# Patient Record
Sex: FEMALE | Race: BLACK OR AFRICAN AMERICAN | Employment: UNEMPLOYED | URBAN - METROPOLITAN AREA
[De-identification: names, ages, dates, MRNs, and addresses within clinical notes are randomized per-mention and may not be internally consistent; named-entity substitution may affect disease eponyms.]

---

## 2017-07-17 ENCOUNTER — OFFICE VISIT (OUTPATIENT)
Dept: FAMILY MEDICINE CLINIC | Facility: CLINIC | Age: 42
End: 2017-07-17

## 2017-07-17 VITALS
BODY MASS INDEX: 29.14 KG/M2 | SYSTOLIC BLOOD PRESSURE: 118 MMHG | DIASTOLIC BLOOD PRESSURE: 76 MMHG | HEIGHT: 65.5 IN | HEART RATE: 96 BPM | WEIGHT: 177 LBS

## 2017-07-17 DIAGNOSIS — Z00.00 LABORATORY EXAMINATION ORDERED AS PART OF A ROUTINE GENERAL MEDICAL EXAMINATION: ICD-10-CM

## 2017-07-17 DIAGNOSIS — Z86.39 HISTORY OF IRON DEFICIENCY: ICD-10-CM

## 2017-07-17 DIAGNOSIS — Z12.39 BREAST CANCER SCREENING: ICD-10-CM

## 2017-07-17 DIAGNOSIS — Z86.39 HISTORY OF VITAMIN D DEFICIENCY: ICD-10-CM

## 2017-07-17 DIAGNOSIS — Z01.419 WELL FEMALE EXAM WITH ROUTINE GYNECOLOGICAL EXAM: Primary | ICD-10-CM

## 2017-07-17 DIAGNOSIS — Z12.4 SCREENING FOR MALIGNANT NEOPLASM OF CERVIX: ICD-10-CM

## 2017-07-17 DIAGNOSIS — N92.0 MENORRHAGIA WITH REGULAR CYCLE: ICD-10-CM

## 2017-07-17 DIAGNOSIS — Z23 NEED FOR VACCINATION: ICD-10-CM

## 2017-07-17 DIAGNOSIS — D25.1 INTRAMURAL LEIOMYOMA OF UTERUS: ICD-10-CM

## 2017-07-17 PROCEDURE — 88175 CYTOPATH C/V AUTO FLUID REDO: CPT | Performed by: FAMILY MEDICINE

## 2017-07-17 PROCEDURE — 87624 HPV HI-RISK TYP POOLED RSLT: CPT | Performed by: FAMILY MEDICINE

## 2017-07-17 PROCEDURE — 99213 OFFICE O/P EST LOW 20 MIN: CPT | Performed by: FAMILY MEDICINE

## 2017-07-17 PROCEDURE — 99396 PREV VISIT EST AGE 40-64: CPT | Performed by: FAMILY MEDICINE

## 2017-07-17 PROCEDURE — 90715 TDAP VACCINE 7 YRS/> IM: CPT | Performed by: FAMILY MEDICINE

## 2017-07-17 PROCEDURE — 90471 IMMUNIZATION ADMIN: CPT | Performed by: FAMILY MEDICINE

## 2017-07-17 RX ORDER — CLINDAMYCIN HYDROCHLORIDE 300 MG/1
CAPSULE ORAL
Refills: 0 | COMMUNITY
Start: 2017-04-20 | End: 2017-07-17 | Stop reason: ALTCHOICE

## 2017-07-17 NOTE — PROGRESS NOTES
HPI:   Breanna Carroll is a 38year old female who presents for a complete physical exam. Symptoms: denies discharge, itching, burning or dysuria, periods are regular.    Heavy periods for 2 days of the menstrual cycle for years has history of large fibro Father      DM   • liver failure[other] [OTHER] Father    • liver transplant[other] [OTHER] Father    • hepatitis C[other] [OTHER] Father    • Diabetes Paternal Grandmother       Social History:   Smoking status: Never Smoker symmetrical, no suspicious mass, no nipple dimpling or discharge.   LUNGS: clear to auscultation bilateral, no rales, rhonchi or wheezing  CARDIO: RRR without murmur normal S1S2  ABD:  normal bowel sounds,soft, non tender, no masses, HSM or tenderness  :e guidance provided recommended low fat diet and aerobic exercise 30 minutes 3-4 times weekly. 2. Intramural leiomyoma of uterus   Menorrhagia with regular cycle    - CBC WITH DIFFERENTIAL WITH PLATELET; Future  - FERRITIN; Future  - FE+TIBC+KENIA;  Future

## 2017-07-18 LAB — HPV I/H RISK 1 DNA SPEC QL NAA+PROBE: NEGATIVE

## 2017-07-19 ENCOUNTER — LAB ENCOUNTER (OUTPATIENT)
Dept: LAB | Age: 42
End: 2017-07-19
Attending: FAMILY MEDICINE
Payer: COMMERCIAL

## 2017-07-19 ENCOUNTER — TELEPHONE (OUTPATIENT)
Dept: FAMILY MEDICINE CLINIC | Facility: CLINIC | Age: 42
End: 2017-07-19

## 2017-07-19 DIAGNOSIS — N92.0 MENORRHAGIA WITH REGULAR CYCLE: ICD-10-CM

## 2017-07-19 DIAGNOSIS — D25.1 INTRAMURAL LEIOMYOMA OF UTERUS: ICD-10-CM

## 2017-07-19 DIAGNOSIS — Z86.39 HISTORY OF IRON DEFICIENCY: ICD-10-CM

## 2017-07-19 DIAGNOSIS — Z86.39 HISTORY OF VITAMIN D DEFICIENCY: ICD-10-CM

## 2017-07-19 DIAGNOSIS — Z00.00 LABORATORY EXAMINATION ORDERED AS PART OF A ROUTINE GENERAL MEDICAL EXAMINATION: ICD-10-CM

## 2017-07-19 DIAGNOSIS — Z86.39 HISTORY OF IRON DEFICIENCY: Primary | ICD-10-CM

## 2017-07-19 LAB
25-HYDROXYVITAMIN D (TOTAL): 20.2 NG/ML (ref 30–100)
ALBUMIN SERPL-MCNC: 3.7 G/DL (ref 3.5–4.8)
ALP LIVER SERPL-CCNC: 61 U/L (ref 37–98)
ALT SERPL-CCNC: 21 U/L (ref 14–54)
AST SERPL-CCNC: 20 U/L (ref 15–41)
BASOPHILS # BLD AUTO: 0.07 X10(3) UL (ref 0–0.1)
BASOPHILS NFR BLD AUTO: 1 %
BILIRUB SERPL-MCNC: 0.3 MG/DL (ref 0.1–2)
BUN BLD-MCNC: 10 MG/DL (ref 8–20)
CALCIUM BLD-MCNC: 8.7 MG/DL (ref 8.3–10.3)
CHLORIDE: 109 MMOL/L (ref 101–111)
CHOLEST SMN-MCNC: 179 MG/DL (ref ?–200)
CO2: 24 MMOL/L (ref 22–32)
CREAT BLD-MCNC: 0.83 MG/DL (ref 0.55–1.02)
DEPRECATED HBV CORE AB SER IA-ACNC: 8.4 NG/ML (ref 10–291)
EOSINOPHIL # BLD AUTO: 0.1 X10(3) UL (ref 0–0.3)
EOSINOPHIL NFR BLD AUTO: 1.4 %
ERYTHROCYTE [DISTWIDTH] IN BLOOD BY AUTOMATED COUNT: 18.4 % (ref 11.5–16)
FREE T4: 1 NG/DL (ref 0.9–1.8)
GLUCOSE BLD-MCNC: 93 MG/DL (ref 70–99)
HCT VFR BLD AUTO: 35.4 % (ref 34–50)
HDLC SERPL-MCNC: 44 MG/DL (ref 45–?)
HDLC SERPL: 4.07 {RATIO} (ref ?–4.44)
HGB BLD-MCNC: 10.8 G/DL (ref 12–16)
IMMATURE GRANULOCYTE COUNT: 0.02 X10(3) UL (ref 0–1)
IMMATURE GRANULOCYTE RATIO %: 0.3 %
IRON SATURATION: 6 % (ref 13–45)
IRON: 30 UG/DL (ref 28–170)
LAST PAP RESULT: NORMAL
LDLC SERPL CALC-MCNC: 114 MG/DL (ref ?–130)
LDLC SERPL-MCNC: 21 MG/DL (ref 5–40)
LYMPHOCYTES # BLD AUTO: 1.6 X10(3) UL (ref 0.9–4)
LYMPHOCYTES NFR BLD AUTO: 22.9 %
M PROTEIN MFR SERPL ELPH: 7.8 G/DL (ref 6.1–8.3)
MCH RBC QN AUTO: 22.8 PG (ref 27–33.2)
MCHC RBC AUTO-ENTMCNC: 30.5 G/DL (ref 31–37)
MCV RBC AUTO: 74.7 FL (ref 81–100)
MONOCYTES # BLD AUTO: 0.63 X10(3) UL (ref 0.1–0.6)
MONOCYTES NFR BLD AUTO: 9 %
NEUTROPHIL ABS PRELIM: 4.58 X10 (3) UL (ref 1.3–6.7)
NEUTROPHILS # BLD AUTO: 4.58 X10(3) UL (ref 1.3–6.7)
NEUTROPHILS NFR BLD AUTO: 65.4 %
NONHDLC SERPL-MCNC: 135 MG/DL (ref ?–130)
PLATELET # BLD AUTO: 303 10(3)UL (ref 150–450)
POTASSIUM SERPL-SCNC: 4 MMOL/L (ref 3.6–5.1)
RBC # BLD AUTO: 4.74 X10(6)UL (ref 3.8–5.1)
RED CELL DISTRIBUTION WIDTH-SD: 49.3 FL (ref 35.1–46.3)
SODIUM SERPL-SCNC: 139 MMOL/L (ref 136–144)
TOTAL IRON BINDING CAPACITY: 495 UG/DL (ref 298–536)
TRANSFERRIN: 332 MG/DL (ref 200–360)
TRIGLYCERIDES: 104 MG/DL (ref ?–150)
TSI SER-ACNC: 0.88 MIU/ML (ref 0.35–5.5)
WBC # BLD AUTO: 7 X10(3) UL (ref 4–13)

## 2017-07-19 PROCEDURE — 80053 COMPREHEN METABOLIC PANEL: CPT

## 2017-07-19 PROCEDURE — 84443 ASSAY THYROID STIM HORMONE: CPT

## 2017-07-19 PROCEDURE — 82306 VITAMIN D 25 HYDROXY: CPT

## 2017-07-19 PROCEDURE — 83540 ASSAY OF IRON: CPT

## 2017-07-19 PROCEDURE — 85025 COMPLETE CBC W/AUTO DIFF WBC: CPT

## 2017-07-19 PROCEDURE — 83550 IRON BINDING TEST: CPT

## 2017-07-19 PROCEDURE — 84439 ASSAY OF FREE THYROXINE: CPT

## 2017-07-19 PROCEDURE — 80061 LIPID PANEL: CPT

## 2017-07-19 PROCEDURE — 82728 ASSAY OF FERRITIN: CPT

## 2017-07-19 PROCEDURE — 36415 COLL VENOUS BLD VENIPUNCTURE: CPT

## 2017-07-20 NOTE — PROGRESS NOTES
Significant Iron deficiency anemia low iron stores (ferritin). Start ferrous sulfate  mg twice daily. Advise may cause constipation use OTC Miralax or stool softer if needed.   CBC in 1 month   CBC, ferritin, iron study in 3 months  Get pelvic u/s

## 2017-07-20 NOTE — TELEPHONE ENCOUNTER
----- Message from Arnold Coleman PA-C sent at 7/19/2017  8:11 PM CDT -----  Significant Iron deficiency anemia low iron stores (ferritin). Start ferrous sulfate  mg twice daily.   Advise may cause constipation use OTC Miralax or stool softer if

## 2017-07-20 NOTE — TELEPHONE ENCOUNTER
lmom for pt to call office to discuss the lab results. Did leave msg that the pap/HPV were normal/negative.

## 2017-07-24 RX ORDER — ERGOCALCIFEROL 1.25 MG/1
50000 CAPSULE ORAL WEEKLY
Qty: 12 CAPSULE | Refills: 0 | Status: SHIPPED | OUTPATIENT
Start: 2017-07-24 | End: 2017-08-23

## 2017-07-24 NOTE — TELEPHONE ENCOUNTER
Patient advised of test results and Prema's recommendations  Patient verbalized understanding  Rx for Vit D qty 12 NR sent to pharmacy  Future labs ordered  Hemoccult collection kit mailed to patient

## 2017-08-03 ENCOUNTER — HOSPITAL ENCOUNTER (OUTPATIENT)
Dept: ULTRASOUND IMAGING | Age: 42
Discharge: HOME | End: 2017-08-03
Attending: FAMILY MEDICINE
Payer: COMMERCIAL

## 2017-08-03 ENCOUNTER — HOSPITAL ENCOUNTER (OUTPATIENT)
Dept: MAMMOGRAPHY | Age: 42
Discharge: HOME | End: 2017-08-03
Attending: FAMILY MEDICINE
Payer: COMMERCIAL

## 2017-08-03 DIAGNOSIS — Z12.39 BREAST CANCER SCREENING: ICD-10-CM

## 2017-08-03 DIAGNOSIS — D25.1 INTRAMURAL LEIOMYOMA OF UTERUS: ICD-10-CM

## 2017-08-03 DIAGNOSIS — Z86.39 HISTORY OF IRON DEFICIENCY: ICD-10-CM

## 2017-08-03 DIAGNOSIS — N92.0 MENORRHAGIA WITH REGULAR CYCLE: ICD-10-CM

## 2017-08-03 PROCEDURE — 77067 SCR MAMMO BI INCL CAD: CPT | Performed by: FAMILY MEDICINE

## 2017-08-03 PROCEDURE — 76830 TRANSVAGINAL US NON-OB: CPT | Performed by: FAMILY MEDICINE

## 2017-08-03 PROCEDURE — 76856 US EXAM PELVIC COMPLETE: CPT | Performed by: FAMILY MEDICINE

## 2017-08-03 NOTE — PROGRESS NOTES
2 of the 3 fibroids have enlarged. Refer to gynecology for management. His iron deficiency anemia and heavier periods.

## 2017-08-04 ENCOUNTER — TELEPHONE (OUTPATIENT)
Dept: FAMILY MEDICINE CLINIC | Facility: CLINIC | Age: 42
End: 2017-08-04

## 2017-08-04 NOTE — TELEPHONE ENCOUNTER
----- Message from Jaymie Dupont PA-C sent at 8/3/2017  4:59 PM CDT -----  2 of the 3 fibroids have enlarged. Refer to gynecology for management. His iron deficiency anemia and heavier periods.

## 2017-08-04 NOTE — TELEPHONE ENCOUNTER
Spoke to patient with results/instructions.   Pt verbalized understanding and gave her the number to [facility] office

## 2017-08-09 ENCOUNTER — LAB ENCOUNTER (OUTPATIENT)
Dept: LAB | Facility: HOSPITAL | Age: 42
End: 2017-08-09
Attending: FAMILY MEDICINE
Payer: COMMERCIAL

## 2017-08-09 DIAGNOSIS — Z86.39 HISTORY OF IRON DEFICIENCY: ICD-10-CM

## 2017-08-09 PROCEDURE — 82272 OCCULT BLD FECES 1-3 TESTS: CPT

## 2017-08-18 ENCOUNTER — TELEPHONE (OUTPATIENT)
Dept: FAMILY MEDICINE CLINIC | Facility: CLINIC | Age: 42
End: 2017-08-18

## 2017-09-12 ENCOUNTER — LAB ENCOUNTER (OUTPATIENT)
Dept: LAB | Age: 42
End: 2017-09-12
Attending: FAMILY MEDICINE
Payer: COMMERCIAL

## 2017-09-12 DIAGNOSIS — Z86.39 HISTORY OF IRON DEFICIENCY: ICD-10-CM

## 2017-09-12 LAB
BASOPHILS # BLD AUTO: 0.04 X10(3) UL (ref 0–0.1)
BASOPHILS NFR BLD AUTO: 0.6 %
EOSINOPHIL # BLD AUTO: 0.09 X10(3) UL (ref 0–0.3)
EOSINOPHIL NFR BLD AUTO: 1.4 %
ERYTHROCYTE [DISTWIDTH] IN BLOOD BY AUTOMATED COUNT: 18 % (ref 11.5–16)
HCT VFR BLD AUTO: 36.9 % (ref 34–50)
HGB BLD-MCNC: 11.8 G/DL (ref 12–16)
IMMATURE GRANULOCYTE COUNT: 0.01 X10(3) UL (ref 0–1)
IMMATURE GRANULOCYTE RATIO %: 0.2 %
LYMPHOCYTES # BLD AUTO: 1.68 X10(3) UL (ref 0.9–4)
LYMPHOCYTES NFR BLD AUTO: 25.6 %
MCH RBC QN AUTO: 25.3 PG (ref 27–33.2)
MCHC RBC AUTO-ENTMCNC: 32 G/DL (ref 31–37)
MCV RBC AUTO: 79 FL (ref 81–100)
MONOCYTES # BLD AUTO: 0.45 X10(3) UL (ref 0.1–0.6)
MONOCYTES NFR BLD AUTO: 6.9 %
NEUTROPHIL ABS PRELIM: 4.29 X10 (3) UL (ref 1.3–6.7)
NEUTROPHILS # BLD AUTO: 4.29 X10(3) UL (ref 1.3–6.7)
NEUTROPHILS NFR BLD AUTO: 65.3 %
PLATELET # BLD AUTO: 269 10(3)UL (ref 150–450)
RBC # BLD AUTO: 4.67 X10(6)UL (ref 3.8–5.1)
RED CELL DISTRIBUTION WIDTH-SD: 51.2 FL (ref 35.1–46.3)
WBC # BLD AUTO: 6.6 X10(3) UL (ref 4–13)

## 2017-09-12 PROCEDURE — 85025 COMPLETE CBC W/AUTO DIFF WBC: CPT

## 2017-09-12 PROCEDURE — 36415 COLL VENOUS BLD VENIPUNCTURE: CPT

## 2017-09-13 ENCOUNTER — TELEPHONE (OUTPATIENT)
Dept: FAMILY MEDICINE CLINIC | Facility: CLINIC | Age: 42
End: 2017-09-13

## 2017-09-13 NOTE — PROGRESS NOTES
C/W ferrous sulfate  mg twice daily.  CBC improving.    CBC, ferritin, iron study in 2 months order in system

## 2017-09-13 NOTE — TELEPHONE ENCOUNTER
The patient was informed of her test results and [facility], DEEPTI, recommendations. Patient verbalized understanding and has no further questions at this time.

## 2017-09-13 NOTE — TELEPHONE ENCOUNTER
----- Message from Bertis Litten, PA-C sent at 9/12/2017  9:00 PM CDT -----  C/W ferrous sulfate  mg twice daily.  CBC improving.    CBC, ferritin, iron study in 2 months order in system

## 2018-03-12 ENCOUNTER — LAB ENCOUNTER (OUTPATIENT)
Dept: LAB | Age: 43
End: 2018-03-12
Attending: FAMILY MEDICINE
Payer: COMMERCIAL

## 2018-03-12 ENCOUNTER — OFFICE VISIT (OUTPATIENT)
Dept: FAMILY MEDICINE CLINIC | Facility: CLINIC | Age: 43
End: 2018-03-12

## 2018-03-12 VITALS
SYSTOLIC BLOOD PRESSURE: 114 MMHG | TEMPERATURE: 98 F | WEIGHT: 168 LBS | HEIGHT: 65.51 IN | DIASTOLIC BLOOD PRESSURE: 76 MMHG | HEART RATE: 96 BPM | BODY MASS INDEX: 27.65 KG/M2

## 2018-03-12 DIAGNOSIS — N92.0 MENORRHAGIA WITH REGULAR CYCLE: ICD-10-CM

## 2018-03-12 DIAGNOSIS — L65.9 HAIR THINNING: ICD-10-CM

## 2018-03-12 DIAGNOSIS — Z86.39 HISTORY OF VITAMIN D DEFICIENCY: ICD-10-CM

## 2018-03-12 DIAGNOSIS — Z86.39 HISTORY OF IRON DEFICIENCY: ICD-10-CM

## 2018-03-12 DIAGNOSIS — R10.13 MIDEPIGASTRIC PAIN: Primary | ICD-10-CM

## 2018-03-12 LAB
25-HYDROXYVITAMIN D (TOTAL): 14 NG/ML (ref 30–100)
BASOPHILS # BLD AUTO: 0.05 X10(3) UL (ref 0–0.1)
BASOPHILS NFR BLD AUTO: 0.8 %
DEPRECATED HBV CORE AB SER IA-ACNC: 4.1 NG/ML (ref 10–291)
EOSINOPHIL # BLD AUTO: 0.13 X10(3) UL (ref 0–0.3)
EOSINOPHIL NFR BLD AUTO: 2 %
ERYTHROCYTE [DISTWIDTH] IN BLOOD BY AUTOMATED COUNT: 15.6 % (ref 11.5–16)
FREE T4: 1 NG/DL (ref 0.9–1.8)
HAV AB SERPL IA-ACNC: 715 PG/ML (ref 193–986)
HCT VFR BLD AUTO: 31.3 % (ref 34–50)
HGB BLD-MCNC: 9.3 G/DL (ref 12–16)
IMMATURE GRANULOCYTE COUNT: 0.01 X10(3) UL (ref 0–1)
IMMATURE GRANULOCYTE RATIO %: 0.2 %
IRON SATURATION: 3 % (ref 13–45)
IRON: 17 UG/DL (ref 28–170)
LYMPHOCYTES # BLD AUTO: 1.74 X10(3) UL (ref 0.9–4)
LYMPHOCYTES NFR BLD AUTO: 27.1 %
MCH RBC QN AUTO: 22 PG (ref 27–33.2)
MCHC RBC AUTO-ENTMCNC: 29.7 G/DL (ref 31–37)
MCV RBC AUTO: 74.2 FL (ref 81–100)
MONOCYTES # BLD AUTO: 0.42 X10(3) UL (ref 0.1–1)
MONOCYTES NFR BLD AUTO: 6.5 %
NEUTROPHIL ABS PRELIM: 4.08 X10 (3) UL (ref 1.3–6.7)
NEUTROPHILS # BLD AUTO: 4.08 X10(3) UL (ref 1.3–6.7)
NEUTROPHILS NFR BLD AUTO: 63.4 %
PLATELET # BLD AUTO: 472 10(3)UL (ref 150–450)
RBC # BLD AUTO: 4.22 X10(6)UL (ref 3.8–5.1)
RED CELL DISTRIBUTION WIDTH-SD: 41.6 FL (ref 35.1–46.3)
TOTAL IRON BINDING CAPACITY: 542 UG/DL (ref 298–536)
TRANSFERRIN: 364 MG/DL (ref 200–360)
TSI SER-ACNC: 0.63 MIU/ML (ref 0.35–5.5)
WBC # BLD AUTO: 6.4 X10(3) UL (ref 4–13)

## 2018-03-12 PROCEDURE — 85025 COMPLETE CBC W/AUTO DIFF WBC: CPT

## 2018-03-12 PROCEDURE — 82728 ASSAY OF FERRITIN: CPT

## 2018-03-12 PROCEDURE — 83550 IRON BINDING TEST: CPT

## 2018-03-12 PROCEDURE — 82306 VITAMIN D 25 HYDROXY: CPT

## 2018-03-12 PROCEDURE — 83540 ASSAY OF IRON: CPT

## 2018-03-12 PROCEDURE — 99214 OFFICE O/P EST MOD 30 MIN: CPT | Performed by: FAMILY MEDICINE

## 2018-03-12 PROCEDURE — 83013 H PYLORI (C-13) BREATH: CPT | Performed by: FAMILY MEDICINE

## 2018-03-12 PROCEDURE — 84443 ASSAY THYROID STIM HORMONE: CPT

## 2018-03-12 PROCEDURE — 36415 COLL VENOUS BLD VENIPUNCTURE: CPT

## 2018-03-12 PROCEDURE — 82607 VITAMIN B-12: CPT

## 2018-03-12 PROCEDURE — 84439 ASSAY OF FREE THYROXINE: CPT

## 2018-03-12 NOTE — PATIENT INSTRUCTIONS
SCHEDULING [facility] LAB APPOINTMENTS ONLINE    Lab appointments can now be scheduled online at [email or web address]    · Go to [email or web address]  · In Search type Lab  · Click \"Lab services\"  · Click \"Schedule Your Test Online\"  · Follow the prompts  · If you Take all of the medicine until it is finished or your healthcare provider tells you to stop, even if you feel better. · Your healthcare provider may recommend avoiding NSAIDs.  If you take daily aspirin for your heart or other medical reasons, do not stop

## 2018-03-12 NOTE — PROGRESS NOTES
Eloisa Corey is a 38year old female.   HPI:   Patient presents with:  Abdominal Pain: Rm. 9: related to gastritis    Head pain is gone     #1 Abdominal pain  Patient complains of some mid epigastric abdominal discomfort that started about 1 and half m oz/week     Glasses of wine: 0 - 1 per week       REVIEW OF SYSTEMS:   GENERAL HEALTH: feels well otherwise  SKIN: denies any unusual skin lesions or rashes hair thinning  RESPIRATORY: denies shortness of breath with exertion  CARDIOVASCULAR: denies chest improve her symptoms she will be doing apple cider vinegar as directed. GERD prevention reviewed avoid caffeine, alcohol, and nonstnoeroidals. Eat small frequent meals and avoid eating 3-4 hours before bedtime, try to raise the head of bed.   Cinda Organi

## 2018-03-13 LAB — H. PYLORI BREATH TEST: POSITIVE

## 2018-03-14 ENCOUNTER — TELEPHONE (OUTPATIENT)
Dept: FAMILY MEDICINE CLINIC | Facility: CLINIC | Age: 43
End: 2018-03-14

## 2018-03-14 DIAGNOSIS — A04.8 H. PYLORI INFECTION: ICD-10-CM

## 2018-03-14 DIAGNOSIS — D50.9 IRON DEFICIENCY ANEMIA, UNSPECIFIED IRON DEFICIENCY ANEMIA TYPE: ICD-10-CM

## 2018-03-14 DIAGNOSIS — E55.9 VITAMIN D DEFICIENCY: Primary | ICD-10-CM

## 2018-03-14 RX ORDER — LANSOPRAZOLE, AMOXICILLIN, CLARITHROMYCIN 30-500-500
KIT ORAL
Qty: 1 KIT | Refills: 0 | Status: SHIPPED | OUTPATIENT
Start: 2018-03-14 | End: 2018-03-15

## 2018-03-14 RX ORDER — ERGOCALCIFEROL 1.25 MG/1
50000 CAPSULE ORAL WEEKLY
Qty: 12 CAPSULE | Refills: 0 | Status: SHIPPED | OUTPATIENT
Start: 2018-03-14 | End: 2018-06-12

## 2018-03-14 NOTE — PROGRESS NOTES
Lab results showed low Vitamin D. Advise RX vitamin D 50,000 IU once weekly for 12 weeks. Recheck vitamin D level in 3 months. After labs we will direct you on the dose of vitamin D needed OTC.   Significant Iron deficiency anemia no iron stores (ferritin

## 2018-03-14 NOTE — TELEPHONE ENCOUNTER
The patient was informed of her test results and [facility], recommendations. Future testing was ordered and prescriptions were sent to the patient's preferred pharmacy. Patient verbalized understanding.  Per the patient's request, a copy of he

## 2018-03-14 NOTE — TELEPHONE ENCOUNTER
Lalitha is calling regarding the Prev pac that was sent over today, her insurance will not cover it, so they would like to see if something else can be sent over or if we can send the prev pac over in 3 different prescriptions.  Please call Lalitha at 8

## 2018-03-14 NOTE — TELEPHONE ENCOUNTER
Prema,please advise rxs. How many days do you want patient to take meds?   Prevpac not covered-must order separately

## 2018-03-14 NOTE — TELEPHONE ENCOUNTER
----- Message from Danielle Hernandez PA-C sent at 3/13/2018  9:35 PM CDT -----  Lab results showed low Vitamin D. Advise RX vitamin D 50,000 IU once weekly for 12 weeks. Recheck vitamin D level in 3 months.   After labs we will direct you on the dose of vi

## 2018-03-14 NOTE — TELEPHONE ENCOUNTER
----- Message from Cait Calderon PA-C sent at 3/13/2018 10:45 PM CDT -----  Prevpac treatment for H pylori  Testing repeat at least four to six weeks after treatment

## 2018-03-15 RX ORDER — CLARITHROMYCIN 500 MG/1
500 TABLET, COATED ORAL 2 TIMES DAILY
Qty: 20 TABLET | Refills: 0 | Status: SHIPPED | OUTPATIENT
Start: 2018-03-15 | End: 2018-08-10 | Stop reason: ALTCHOICE

## 2018-03-15 RX ORDER — AMOXICILLIN 500 MG/1
1000 CAPSULE ORAL 2 TIMES DAILY
Qty: 40 CAPSULE | Refills: 0 | Status: SHIPPED | OUTPATIENT
Start: 2018-03-15 | End: 2018-03-25

## 2018-03-15 RX ORDER — LANSOPRAZOLE 30 MG/1
30 CAPSULE, DELAYED RELEASE ORAL 2 TIMES DAILY
Qty: 20 CAPSULE | Refills: 0 | Status: SHIPPED | OUTPATIENT
Start: 2018-03-15 | End: 2019-02-15 | Stop reason: ALTCHOICE

## 2018-05-15 ENCOUNTER — OFFICE VISIT (OUTPATIENT)
Dept: OBGYN CLINIC | Facility: CLINIC | Age: 43
End: 2018-05-15

## 2018-05-15 VITALS
SYSTOLIC BLOOD PRESSURE: 148 MMHG | WEIGHT: 173 LBS | DIASTOLIC BLOOD PRESSURE: 98 MMHG | HEIGHT: 65.5 IN | BODY MASS INDEX: 28.48 KG/M2

## 2018-05-15 DIAGNOSIS — D50.0 IRON DEFICIENCY ANEMIA DUE TO CHRONIC BLOOD LOSS: ICD-10-CM

## 2018-05-15 DIAGNOSIS — D25.1 INTRAMURAL AND SUBSEROUS LEIOMYOMA OF UTERUS: ICD-10-CM

## 2018-05-15 DIAGNOSIS — D25.2 INTRAMURAL AND SUBSEROUS LEIOMYOMA OF UTERUS: ICD-10-CM

## 2018-05-15 DIAGNOSIS — N92.0 MENORRHAGIA WITH REGULAR CYCLE: Primary | ICD-10-CM

## 2018-05-15 PROCEDURE — 99203 OFFICE O/P NEW LOW 30 MIN: CPT | Performed by: OBSTETRICS & GYNECOLOGY

## 2018-05-15 RX ORDER — MELATONIN
325
COMMUNITY
End: 2019-09-30

## 2018-05-15 NOTE — PROGRESS NOTES
Carol Casatneda is a 38year old female. HPI:   Patient presents with: Other: Patient here referred by PCP with c/o heavy menses, and to discuss US findings      Had u/s last august and was told fibroids are bigger.   She is anemic  Menses are regular (two) times daily. Disp: 20 capsule Rfl: 0       Allergies:  No Known Allergies      ROS:   As above    PHYSICAL EXAM:   . ..Vulva:  normal.   Introitus normal.  good perineal support. Urethra:  normal.  Vagina:  normal.  bloody discharge.   Cervix:  normal

## 2018-07-07 NOTE — LETTER
01/22/21        Deysi K[facility]  [address] [address] [place], [place] [id number]      Dear Esperanza Verdin,    Our records indicate that you have outstanding lab work and or testing that was ordered for you and has not yet been completed:  Orders Placed This Encounter Yes

## 2018-07-19 ENCOUNTER — HOSPITAL ENCOUNTER (OUTPATIENT)
Dept: ULTRASOUND IMAGING | Age: 43
Discharge: HOME | End: 2018-07-19
Attending: OBSTETRICS & GYNECOLOGY
Payer: COMMERCIAL

## 2018-07-19 DIAGNOSIS — D25.1 INTRAMURAL AND SUBSEROUS LEIOMYOMA OF UTERUS: ICD-10-CM

## 2018-07-19 DIAGNOSIS — D25.2 INTRAMURAL AND SUBSEROUS LEIOMYOMA OF UTERUS: ICD-10-CM

## 2018-07-19 DIAGNOSIS — N92.0 MENORRHAGIA WITH REGULAR CYCLE: ICD-10-CM

## 2018-07-19 DIAGNOSIS — D50.0 IRON DEFICIENCY ANEMIA DUE TO CHRONIC BLOOD LOSS: ICD-10-CM

## 2018-07-19 PROCEDURE — 76856 US EXAM PELVIC COMPLETE: CPT | Performed by: OBSTETRICS & GYNECOLOGY

## 2018-07-19 PROCEDURE — 76830 TRANSVAGINAL US NON-OB: CPT | Performed by: OBSTETRICS & GYNECOLOGY

## 2018-07-20 ENCOUNTER — LAB ENCOUNTER (OUTPATIENT)
Dept: LAB | Age: 43
End: 2018-07-20
Attending: FAMILY MEDICINE
Payer: COMMERCIAL

## 2018-07-20 ENCOUNTER — TELEPHONE (OUTPATIENT)
Dept: FAMILY MEDICINE CLINIC | Facility: CLINIC | Age: 43
End: 2018-07-20

## 2018-07-20 DIAGNOSIS — E55.9 VITAMIN D DEFICIENCY: ICD-10-CM

## 2018-07-20 DIAGNOSIS — D50.9 IRON DEFICIENCY ANEMIA, UNSPECIFIED IRON DEFICIENCY ANEMIA TYPE: ICD-10-CM

## 2018-07-20 DIAGNOSIS — Z12.39 SCREENING FOR MALIGNANT NEOPLASM OF BREAST: Primary | ICD-10-CM

## 2018-07-20 LAB
BASOPHILS # BLD AUTO: 0.05 X10(3) UL (ref 0–0.1)
BASOPHILS NFR BLD AUTO: 0.7 %
DEPRECATED HBV CORE AB SER IA-ACNC: 4.2 NG/ML (ref 12–240)
EOSINOPHIL # BLD AUTO: 0.09 X10(3) UL (ref 0–0.3)
EOSINOPHIL NFR BLD AUTO: 1.3 %
ERYTHROCYTE [DISTWIDTH] IN BLOOD BY AUTOMATED COUNT: 15.5 % (ref 11.5–16)
HCT VFR BLD AUTO: 26.5 % (ref 34–50)
HGB BLD-MCNC: 7.8 G/DL (ref 12–16)
IMMATURE GRANULOCYTE COUNT: 0.02 X10(3) UL (ref 0–1)
IMMATURE GRANULOCYTE RATIO %: 0.3 %
IRON SATURATION: 2 % (ref 15–50)
IRON: 13 UG/DL (ref 28–170)
LYMPHOCYTES # BLD AUTO: 1.93 X10(3) UL (ref 0.9–4)
LYMPHOCYTES NFR BLD AUTO: 27.3 %
MCH RBC QN AUTO: 22.9 PG (ref 27–33.2)
MCHC RBC AUTO-ENTMCNC: 29.4 G/DL (ref 31–37)
MCV RBC AUTO: 77.7 FL (ref 81–100)
MONOCYTES # BLD AUTO: 0.64 X10(3) UL (ref 0.1–1)
MONOCYTES NFR BLD AUTO: 9.1 %
NEUTROPHIL ABS PRELIM: 4.33 X10 (3) UL (ref 1.3–6.7)
NEUTROPHILS # BLD AUTO: 4.33 X10(3) UL (ref 1.3–6.7)
NEUTROPHILS NFR BLD AUTO: 61.3 %
PLATELET # BLD AUTO: 325 10(3)UL (ref 150–450)
RBC # BLD AUTO: 3.41 X10(6)UL (ref 3.8–5.1)
RED CELL DISTRIBUTION WIDTH-SD: 42.8 FL (ref 35.1–46.3)
TOTAL IRON BINDING CAPACITY: 536 UG/DL (ref 240–450)
TRANSFERRIN SERPL-MCNC: 360 MG/DL (ref 200–360)
VIT D+METAB SERPL-MCNC: 25.9 NG/ML (ref 30–100)
WBC # BLD AUTO: 7.1 X10(3) UL (ref 4–13)

## 2018-07-20 PROCEDURE — 82728 ASSAY OF FERRITIN: CPT

## 2018-07-20 PROCEDURE — 36415 COLL VENOUS BLD VENIPUNCTURE: CPT

## 2018-07-20 PROCEDURE — 83550 IRON BINDING TEST: CPT

## 2018-07-20 PROCEDURE — 82306 VITAMIN D 25 HYDROXY: CPT

## 2018-07-20 PROCEDURE — 83540 ASSAY OF IRON: CPT

## 2018-07-20 PROCEDURE — 85025 COMPLETE CBC W/AUTO DIFF WBC: CPT

## 2018-07-20 NOTE — TELEPHONE ENCOUNTER
Rico Falk is calling to get a mammogram order from Natalia Araiza, she is having her labs done today and she will schedule her mammogram and then she will schedule her physical for Natalia Araiza, she is due, but she would like to get the labs and mammo done first.

## 2018-07-22 NOTE — PROGRESS NOTES
Hemoglobin/iron/ferritin is much lower refer to hematology for iron infusion this week. . Has she been taking any of the iron? Needs underlying treatment of presumed source for iron deficiency which has been heavy menstrual bleeding.   She had a consult wit

## 2018-07-23 ENCOUNTER — TELEPHONE (OUTPATIENT)
Dept: FAMILY MEDICINE CLINIC | Facility: CLINIC | Age: 43
End: 2018-07-23

## 2018-07-23 DIAGNOSIS — Z86.39 HISTORY OF IRON DEFICIENCY: ICD-10-CM

## 2018-07-23 DIAGNOSIS — E55.9 VITAMIN D DEFICIENCY: Primary | ICD-10-CM

## 2018-07-23 NOTE — TELEPHONE ENCOUNTER
----- Message from Ross Choi PA-C sent at 7/22/2018  3:59 PM CDT -----  Hemoglobin/iron/ferritin is much lower refer to hematology for iron infusion this week. . Has she been taking any of the iron?   Needs underlying treatment of presumed source for

## 2018-07-24 RX ORDER — ERGOCALCIFEROL 1.25 MG/1
CAPSULE ORAL
Qty: 12 CAPSULE | Refills: 0 | Status: SHIPPED | OUTPATIENT
Start: 2018-07-24 | End: 2019-02-15 | Stop reason: ALTCHOICE

## 2018-07-24 NOTE — TELEPHONE ENCOUNTER
S/w patient regarding lab results. Patient states that she has not been taking the iron . Patient advised that she can either have an iron transfusion or she can take iron OTC 2-3 tabs daily for a week then repeat CBC.  Patient states that she is currently

## 2018-08-02 ENCOUNTER — LAB ENCOUNTER (OUTPATIENT)
Dept: LAB | Age: 43
End: 2018-08-02
Attending: FAMILY MEDICINE
Payer: COMMERCIAL

## 2018-08-02 DIAGNOSIS — A04.8 H. PYLORI INFECTION: ICD-10-CM

## 2018-08-02 DIAGNOSIS — Z86.39 HISTORY OF IRON DEFICIENCY: ICD-10-CM

## 2018-08-02 LAB
BASOPHILS # BLD AUTO: 0.03 X10(3) UL (ref 0–0.1)
BASOPHILS NFR BLD AUTO: 0.4 %
EOSINOPHIL # BLD AUTO: 0.14 X10(3) UL (ref 0–0.3)
EOSINOPHIL NFR BLD AUTO: 1.8 %
ERYTHROCYTE [DISTWIDTH] IN BLOOD BY AUTOMATED COUNT: 21.3 % (ref 11.5–16)
HCT VFR BLD AUTO: 29.7 % (ref 34–50)
HGB BLD-MCNC: 8.6 G/DL (ref 12–16)
IMMATURE GRANULOCYTE COUNT: 0.09 X10(3) UL (ref 0–1)
IMMATURE GRANULOCYTE RATIO %: 1.2 %
LYMPHOCYTES # BLD AUTO: 1.61 X10(3) UL (ref 0.9–4)
LYMPHOCYTES NFR BLD AUTO: 21 %
MCH RBC QN AUTO: 22.9 PG (ref 27–33.2)
MCHC RBC AUTO-ENTMCNC: 29 G/DL (ref 31–37)
MCV RBC AUTO: 79.2 FL (ref 81–100)
MONOCYTES # BLD AUTO: 0.39 X10(3) UL (ref 0.1–1)
MONOCYTES NFR BLD AUTO: 5.1 %
NEUTROPHIL ABS PRELIM: 5.42 X10 (3) UL (ref 1.3–6.7)
NEUTROPHILS # BLD AUTO: 5.42 X10(3) UL (ref 1.3–6.7)
NEUTROPHILS NFR BLD AUTO: 70.5 %
PLATELET # BLD AUTO: 196 10(3)UL (ref 150–450)
RBC # BLD AUTO: 3.75 X10(6)UL (ref 3.8–5.1)
RED CELL DISTRIBUTION WIDTH-SD: 47.3 FL (ref 35.1–46.3)
WBC # BLD AUTO: 7.7 X10(3) UL (ref 4–13)

## 2018-08-02 PROCEDURE — 83013 H PYLORI (C-13) BREATH: CPT

## 2018-08-02 PROCEDURE — 36415 COLL VENOUS BLD VENIPUNCTURE: CPT

## 2018-08-02 PROCEDURE — 85025 COMPLETE CBC W/AUTO DIFF WBC: CPT

## 2018-08-02 NOTE — PROGRESS NOTES
CBC is improved from last time let us recheck again in 2 weeks to make sure it is at least at 10 and keep taking iron at 2 per day. If not feeling much better we can send you for the iron transfusion.

## 2018-08-03 LAB — H. PYLORI BREATH TEST: POSITIVE

## 2018-08-06 ENCOUNTER — TELEPHONE (OUTPATIENT)
Dept: FAMILY MEDICINE CLINIC | Facility: CLINIC | Age: 43
End: 2018-08-06

## 2018-08-06 DIAGNOSIS — E61.1 IRON DEFICIENCY: Primary | ICD-10-CM

## 2018-08-06 NOTE — TELEPHONE ENCOUNTER
----- Message from Justice Butts PA-C sent at 8/2/2018  3:13 PM CDT -----  CBC is improved from last time let us recheck again in 2 weeks to make sure it is at least at 10 and keep taking iron at 2 per day.   If not feeling much better we can send you f

## 2018-08-06 NOTE — TELEPHONE ENCOUNTER
The patient was read Prema's result note comments verbatim and an order was placed for a CBC to be done on 8/16/2018.

## 2018-08-06 NOTE — PROGRESS NOTES
Since still positive for H pylori take   Prevacid 30 mg  plus levofloxacin 500 mg once daily plus amoxicillin 1 g twice daily and probiotic  all for 10 d  Then repeat testing in 2 months

## 2018-08-08 ENCOUNTER — TELEPHONE (OUTPATIENT)
Dept: FAMILY MEDICINE CLINIC | Facility: CLINIC | Age: 43
End: 2018-08-08

## 2018-08-08 DIAGNOSIS — A04.8 H. PYLORI INFECTION: Primary | ICD-10-CM

## 2018-08-08 RX ORDER — LEVOFLOXACIN 500 MG/1
500 TABLET, FILM COATED ORAL DAILY
Qty: 10 TABLET | Refills: 0 | Status: SHIPPED | OUTPATIENT
Start: 2018-08-08 | End: 2018-08-18

## 2018-08-08 RX ORDER — AMOXICILLIN 500 MG/1
CAPSULE ORAL
Qty: 40 CAPSULE | Refills: 0 | Status: SHIPPED | OUTPATIENT
Start: 2018-08-08 | End: 2019-02-15 | Stop reason: ALTCHOICE

## 2018-08-08 RX ORDER — LANSOPRAZOLE 30 MG/1
30 CAPSULE, DELAYED RELEASE ORAL
Qty: 10 CAPSULE | Refills: 0 | Status: SHIPPED | OUTPATIENT
Start: 2018-08-08 | End: 2018-08-10

## 2018-08-08 NOTE — TELEPHONE ENCOUNTER
Spoke to patient with results/instructions and pt verbalized understanding. meds sent to pharmacy.   Lab in for 2 months

## 2018-08-08 NOTE — TELEPHONE ENCOUNTER
----- Message from Saida Headley PA-C sent at 8/6/2018  4:42 PM CDT -----  Since still positive for H pylori take   Prevacid 30 mg  plus levofloxacin 500 mg once daily plus amoxicillin 1 g twice daily and probiotic  all for 10 d  Then repeat testing in

## 2018-08-10 ENCOUNTER — OFFICE VISIT (OUTPATIENT)
Dept: OBGYN CLINIC | Facility: CLINIC | Age: 43
End: 2018-08-10

## 2018-08-10 ENCOUNTER — HOSPITAL ENCOUNTER (OUTPATIENT)
Dept: MAMMOGRAPHY | Age: 43
Discharge: HOME | End: 2018-08-10
Attending: FAMILY MEDICINE
Payer: COMMERCIAL

## 2018-08-10 VITALS
BODY MASS INDEX: 28.97 KG/M2 | HEIGHT: 65.5 IN | HEART RATE: 76 BPM | WEIGHT: 176 LBS | DIASTOLIC BLOOD PRESSURE: 86 MMHG | SYSTOLIC BLOOD PRESSURE: 132 MMHG

## 2018-08-10 DIAGNOSIS — D25.1 INTRAMURAL AND SUBSEROUS LEIOMYOMA OF UTERUS: Primary | ICD-10-CM

## 2018-08-10 DIAGNOSIS — Z12.39 SCREENING FOR MALIGNANT NEOPLASM OF BREAST: ICD-10-CM

## 2018-08-10 DIAGNOSIS — D25.2 INTRAMURAL AND SUBSEROUS LEIOMYOMA OF UTERUS: Primary | ICD-10-CM

## 2018-08-10 DIAGNOSIS — D50.0 IRON DEFICIENCY ANEMIA DUE TO CHRONIC BLOOD LOSS: ICD-10-CM

## 2018-08-10 DIAGNOSIS — N92.0 MENORRHAGIA WITH REGULAR CYCLE: ICD-10-CM

## 2018-08-10 PROCEDURE — 99213 OFFICE O/P EST LOW 20 MIN: CPT | Performed by: OBSTETRICS & GYNECOLOGY

## 2018-08-10 PROCEDURE — 77067 SCR MAMMO BI INCL CAD: CPT | Performed by: FAMILY MEDICINE

## 2018-08-10 NOTE — PATIENT INSTRUCTIONS
Tranexamic acid oral tablets  Brand Name: Pratibha Howard  What is this medicine? TRANEXAMIC ACID (DINH ex AM ik AS id) slows down or stops blood clots from being broken down. This medicine is used to treat heavy monthly menstrual bleeding.   How should I use th · certain medicines used to help your blood clot  · tretinoin (taken by mouth)  What if I miss a dose? If you miss a dose, take it when you remember, and then take your next dose at least 6 hours later.  Do not take more than 2 tablets at a time to make up

## 2018-08-10 NOTE — PROGRESS NOTES
Darren Russ is a 37year old female.     HPI:   Patient presents with:  Results: fu on US results      Reviewed u/s with pt  Marked increase in size and number of fibroids  Also reviewed with her her anemia  She is currently on fe bid, but d/w her may daily with breakfast. Disp:  Rfl:    lansoprazole 30 MG Oral Capsule Delayed Release Take 1 capsule (30 mg total) by mouth 2 (two) times daily.  Disp: 20 capsule Rfl: 0       Allergies:  No Known Allergies      ROS:   As above    See u/s     ASSESSMENT/PLAN

## 2018-08-16 ENCOUNTER — LABORATORY ENCOUNTER (OUTPATIENT)
Dept: LAB | Age: 43
End: 2018-08-16
Attending: FAMILY MEDICINE
Payer: COMMERCIAL

## 2018-08-16 DIAGNOSIS — E61.1 IRON DEFICIENCY: ICD-10-CM

## 2018-08-16 LAB
BASOPHILS # BLD AUTO: 0.08 X10(3) UL (ref 0–0.1)
BASOPHILS NFR BLD AUTO: 1.2 %
EOSINOPHIL # BLD AUTO: 0.19 X10(3) UL (ref 0–0.3)
EOSINOPHIL NFR BLD AUTO: 2.8 %
ERYTHROCYTE [DISTWIDTH] IN BLOOD BY AUTOMATED COUNT: 23.7 % (ref 11.5–16)
HCT VFR BLD AUTO: 34.9 % (ref 34–50)
HGB BLD-MCNC: 10.1 G/DL (ref 12–16)
IMMATURE GRANULOCYTE COUNT: 0.02 X10(3) UL (ref 0–1)
IMMATURE GRANULOCYTE RATIO %: 0.3 %
LYMPHOCYTES # BLD AUTO: 2.03 X10(3) UL (ref 0.9–4)
LYMPHOCYTES NFR BLD AUTO: 29.5 %
MCH RBC QN AUTO: 24.2 PG (ref 27–33.2)
MCHC RBC AUTO-ENTMCNC: 28.9 G/DL (ref 31–37)
MCV RBC AUTO: 83.7 FL (ref 81–100)
MONOCYTES # BLD AUTO: 0.52 X10(3) UL (ref 0.1–1)
MONOCYTES NFR BLD AUTO: 7.6 %
NEUTROPHIL ABS PRELIM: 4.03 X10 (3) UL (ref 1.3–6.7)
NEUTROPHILS # BLD AUTO: 4.03 X10(3) UL (ref 1.3–6.7)
NEUTROPHILS NFR BLD AUTO: 58.6 %
PLATELET # BLD AUTO: 479 10(3)UL (ref 150–450)
PLATELET MORPHOLOGY: NORMAL
RBC # BLD AUTO: 4.17 X10(6)UL (ref 3.8–5.1)
RED CELL DISTRIBUTION WIDTH-SD: 70.3 FL (ref 35.1–46.3)
WBC # BLD AUTO: 6.9 X10(3) UL (ref 4–13)

## 2018-08-16 PROCEDURE — 36415 COLL VENOUS BLD VENIPUNCTURE: CPT

## 2018-08-16 PROCEDURE — 85025 COMPLETE CBC W/AUTO DIFF WBC: CPT

## 2018-08-17 NOTE — PROGRESS NOTES
Continue with the 2 iron per day and repeat CBC and 1 more month improvement was from 8.6 now up to 10.1 goal is above 12.

## 2018-08-20 ENCOUNTER — TELEPHONE (OUTPATIENT)
Dept: FAMILY MEDICINE CLINIC | Facility: CLINIC | Age: 43
End: 2018-08-20

## 2018-08-20 DIAGNOSIS — E61.1 IRON DEFICIENCY: Primary | ICD-10-CM

## 2018-08-20 NOTE — TELEPHONE ENCOUNTER
----- Message from Patricia Goldsmith PA-C sent at 8/17/2018  4:03 PM CDT -----  Continue with the 2 iron per day and repeat CBC and 1 more month improvement was from 8.6 now up to 10.1 goal is above 12.

## 2018-08-20 NOTE — TELEPHONE ENCOUNTER
The patient was informed of her test results and [facility], PA-C, recommendations. Cbc has been ordered to be due in 1 month. The patient verbalized understanding and has no further questions at this time.

## 2018-09-20 ENCOUNTER — LAB ENCOUNTER (OUTPATIENT)
Dept: LAB | Age: 43
End: 2018-09-20
Attending: FAMILY MEDICINE
Payer: COMMERCIAL

## 2018-09-20 DIAGNOSIS — E61.1 IRON DEFICIENCY: ICD-10-CM

## 2018-09-20 LAB
BASOPHILS # BLD AUTO: 0.05 X10(3) UL (ref 0–0.1)
BASOPHILS NFR BLD AUTO: 0.8 %
EOSINOPHIL # BLD AUTO: 0.14 X10(3) UL (ref 0–0.3)
EOSINOPHIL NFR BLD AUTO: 2.1 %
ERYTHROCYTE [DISTWIDTH] IN BLOOD BY AUTOMATED COUNT: 18.9 % (ref 11.5–16)
HCT VFR BLD AUTO: 37.6 % (ref 34–50)
HGB BLD-MCNC: 11.3 G/DL (ref 12–16)
IMMATURE GRANULOCYTE COUNT: 0.03 X10(3) UL (ref 0–1)
IMMATURE GRANULOCYTE RATIO %: 0.5 %
LYMPHOCYTES # BLD AUTO: 1.82 X10(3) UL (ref 0.9–4)
LYMPHOCYTES NFR BLD AUTO: 27.5 %
MCH RBC QN AUTO: 25 PG (ref 27–33.2)
MCHC RBC AUTO-ENTMCNC: 30.1 G/DL (ref 31–37)
MCV RBC AUTO: 83.2 FL (ref 81–100)
MONOCYTES # BLD AUTO: 0.4 X10(3) UL (ref 0.1–1)
MONOCYTES NFR BLD AUTO: 6 %
NEUTROPHIL ABS PRELIM: 4.19 X10 (3) UL (ref 1.3–6.7)
NEUTROPHILS # BLD AUTO: 4.19 X10(3) UL (ref 1.3–6.7)
NEUTROPHILS NFR BLD AUTO: 63.1 %
PLATELET # BLD AUTO: 351 10(3)UL (ref 150–450)
RBC # BLD AUTO: 4.52 X10(6)UL (ref 3.8–5.1)
RED CELL DISTRIBUTION WIDTH-SD: 57.4 FL (ref 35.1–46.3)
WBC # BLD AUTO: 6.6 X10(3) UL (ref 4–13)

## 2018-09-20 PROCEDURE — 85025 COMPLETE CBC W/AUTO DIFF WBC: CPT

## 2018-09-20 PROCEDURE — 36415 COLL VENOUS BLD VENIPUNCTURE: CPT

## 2018-09-20 NOTE — PROGRESS NOTES
Good improved CBC almost to normal continue with same dose of iron and repeat CBC with iron study and ferritin in 1 month.

## 2018-09-21 ENCOUNTER — TELEPHONE (OUTPATIENT)
Dept: FAMILY MEDICINE CLINIC | Facility: CLINIC | Age: 43
End: 2018-09-21

## 2018-09-21 DIAGNOSIS — Z86.39 HISTORY OF IRON DEFICIENCY: Primary | ICD-10-CM

## 2018-09-21 NOTE — TELEPHONE ENCOUNTER
----- Message from Leeanna Roger PA-C sent at 9/20/2018  5:23 PM CDT -----  Good improved CBC almost to normal continue with same dose of iron and repeat CBC with iron study and ferritin in 1 month.

## 2018-09-21 NOTE — TELEPHONE ENCOUNTER
lmom for pt with results/instructions. Labs in system. Asked pt after reviewing message to call office with any questions.

## 2018-11-09 ENCOUNTER — LAB ENCOUNTER (OUTPATIENT)
Dept: LAB | Age: 43
End: 2018-11-09
Attending: FAMILY MEDICINE
Payer: COMMERCIAL

## 2018-11-09 DIAGNOSIS — Z86.39 HISTORY OF IRON DEFICIENCY: ICD-10-CM

## 2018-11-09 PROCEDURE — 82728 ASSAY OF FERRITIN: CPT

## 2018-11-09 PROCEDURE — 83540 ASSAY OF IRON: CPT

## 2018-11-09 PROCEDURE — 36415 COLL VENOUS BLD VENIPUNCTURE: CPT

## 2018-11-09 PROCEDURE — 85025 COMPLETE CBC W/AUTO DIFF WBC: CPT

## 2018-11-09 PROCEDURE — 83550 IRON BINDING TEST: CPT

## 2018-11-12 ENCOUNTER — TELEPHONE (OUTPATIENT)
Dept: FAMILY MEDICINE CLINIC | Facility: CLINIC | Age: 43
End: 2018-11-12

## 2019-02-15 ENCOUNTER — OFFICE VISIT (OUTPATIENT)
Dept: FAMILY MEDICINE CLINIC | Facility: CLINIC | Age: 44
End: 2019-02-15

## 2019-02-15 VITALS
HEIGHT: 65.51 IN | DIASTOLIC BLOOD PRESSURE: 88 MMHG | HEART RATE: 84 BPM | BODY MASS INDEX: 29.46 KG/M2 | TEMPERATURE: 99 F | WEIGHT: 179 LBS | SYSTOLIC BLOOD PRESSURE: 140 MMHG

## 2019-02-15 DIAGNOSIS — R14.3 FLATUS: Primary | ICD-10-CM

## 2019-02-15 DIAGNOSIS — D25.1 INTRAMURAL LEIOMYOMA OF UTERUS: ICD-10-CM

## 2019-02-15 DIAGNOSIS — R14.0 BLOATING SYMPTOM: ICD-10-CM

## 2019-02-15 DIAGNOSIS — Z86.19 HISTORY OF HELICOBACTER PYLORI INFECTION: ICD-10-CM

## 2019-02-15 DIAGNOSIS — R19.5 LOOSE STOOLS: ICD-10-CM

## 2019-02-15 PROBLEM — L65.9 HAIR THINNING: Status: RESOLVED | Noted: 2018-03-12 | Resolved: 2019-02-15

## 2019-02-15 PROCEDURE — 99213 OFFICE O/P EST LOW 20 MIN: CPT | Performed by: FAMILY MEDICINE

## 2019-02-15 RX ORDER — CHOLECALCIFEROL (VITAMIN D3) 10(400)/ML
DROPS ORAL
COMMUNITY
End: 2019-02-15 | Stop reason: CLARIF

## 2019-02-15 NOTE — PROGRESS NOTES
Maryellen Fitzgerald is a 37year old female. HPI:   Patient  complaints of increased gas and bloating for 2 weeks. Patient states that she did a 21-day cleanse starting 1/7/19 with only veggies and fruits.   Then restarted regular foods and started experien respiratory infections of unspecified site 9/30/11   • Cough 4/18/11   • Laboratory examination ordered as part of a routine general medical examination     Blood chemistry screening   • Otalgia, unspecified 9/16/10   • Routine gynecological examination types were placed in this encounter. Meds & Refills for this Visit:  Requested Prescriptions      No prescriptions requested or ordered in this encounter       Imaging & Consults:  None  1.  Flatus   Loose stools  Probiotics, hold off on dairy products

## 2019-02-19 ENCOUNTER — NURSE ONLY (OUTPATIENT)
Dept: FAMILY MEDICINE CLINIC | Facility: CLINIC | Age: 44
End: 2019-02-19

## 2019-02-19 DIAGNOSIS — A04.8 H. PYLORI INFECTION: ICD-10-CM

## 2019-02-19 PROCEDURE — 83013 H PYLORI (C-13) BREATH: CPT | Performed by: FAMILY MEDICINE

## 2019-02-20 LAB — H. PYLORI BREATH TEST: NEGATIVE

## 2019-02-22 ENCOUNTER — TELEPHONE (OUTPATIENT)
Dept: FAMILY MEDICINE CLINIC | Facility: CLINIC | Age: 44
End: 2019-02-22

## 2019-02-22 NOTE — TELEPHONE ENCOUNTER
----- Message from Patricia Goldsmith PA-C sent at 2/21/2019  4:09 PM CST -----  Negative Helicobacter pylori breath test.

## 2019-02-25 ENCOUNTER — LAB ENCOUNTER (OUTPATIENT)
Dept: LAB | Age: 44
End: 2019-02-25
Attending: FAMILY MEDICINE
Payer: COMMERCIAL

## 2019-02-25 ENCOUNTER — OFFICE VISIT (OUTPATIENT)
Dept: FAMILY MEDICINE CLINIC | Facility: CLINIC | Age: 44
End: 2019-02-25

## 2019-02-25 VITALS
DIASTOLIC BLOOD PRESSURE: 90 MMHG | SYSTOLIC BLOOD PRESSURE: 138 MMHG | HEIGHT: 65.51 IN | WEIGHT: 174 LBS | BODY MASS INDEX: 28.64 KG/M2 | HEART RATE: 76 BPM

## 2019-02-25 DIAGNOSIS — N92.0 MENORRHAGIA WITH REGULAR CYCLE: ICD-10-CM

## 2019-02-25 DIAGNOSIS — D64.9 FATIGUE ASSOCIATED WITH ANEMIA: Primary | ICD-10-CM

## 2019-02-25 DIAGNOSIS — R55 VASOVAGAL NEAR SYNCOPE: ICD-10-CM

## 2019-02-25 DIAGNOSIS — R19.5 LOOSE STOOLS: ICD-10-CM

## 2019-02-25 DIAGNOSIS — Z86.39 HISTORY OF IRON DEFICIENCY: ICD-10-CM

## 2019-02-25 DIAGNOSIS — D64.9 FATIGUE ASSOCIATED WITH ANEMIA: ICD-10-CM

## 2019-02-25 PROBLEM — R10.13 MIDEPIGASTRIC PAIN: Status: RESOLVED | Noted: 2018-03-12 | Resolved: 2019-02-25

## 2019-02-25 PROCEDURE — 99214 OFFICE O/P EST MOD 30 MIN: CPT | Performed by: FAMILY MEDICINE

## 2019-03-06 ENCOUNTER — TELEPHONE (OUTPATIENT)
Dept: FAMILY MEDICINE CLINIC | Facility: CLINIC | Age: 44
End: 2019-03-06

## 2019-03-06 ENCOUNTER — LAB ENCOUNTER (OUTPATIENT)
Dept: LAB | Age: 44
End: 2019-03-06
Attending: FAMILY MEDICINE
Payer: COMMERCIAL

## 2019-03-06 DIAGNOSIS — Z86.39 HISTORY OF IRON DEFICIENCY: ICD-10-CM

## 2019-03-06 DIAGNOSIS — R19.5 LOOSE STOOLS: ICD-10-CM

## 2019-03-06 DIAGNOSIS — N92.0 MENORRHAGIA WITH REGULAR CYCLE: ICD-10-CM

## 2019-03-06 DIAGNOSIS — D64.9 FATIGUE ASSOCIATED WITH ANEMIA: ICD-10-CM

## 2019-03-06 LAB
ALBUMIN SERPL-MCNC: 3.9 G/DL (ref 3.4–5)
ALBUMIN/GLOB SERPL: 1.1 {RATIO} (ref 1–2)
ALP LIVER SERPL-CCNC: 48 U/L (ref 37–98)
ALT SERPL-CCNC: 31 U/L (ref 13–56)
ANION GAP SERPL CALC-SCNC: 5 MMOL/L (ref 0–18)
AST SERPL-CCNC: 20 U/L (ref 15–37)
BASOPHILS # BLD AUTO: 0.04 X10(3) UL (ref 0–0.2)
BASOPHILS NFR BLD AUTO: 0.7 %
BILIRUB SERPL-MCNC: 0.2 MG/DL (ref 0.1–2)
BUN BLD-MCNC: 10 MG/DL (ref 7–18)
BUN/CREAT SERPL: 11.6 (ref 10–20)
CALCIUM BLD-MCNC: 8.9 MG/DL (ref 8.5–10.1)
CHLORIDE SERPL-SCNC: 107 MMOL/L (ref 98–107)
CO2 SERPL-SCNC: 27 MMOL/L (ref 21–32)
CREAT BLD-MCNC: 0.86 MG/DL (ref 0.55–1.02)
DEPRECATED HBV CORE AB SER IA-ACNC: 15.7 NG/ML (ref 12–240)
DEPRECATED RDW RBC AUTO: 50.5 FL (ref 35.1–46.3)
EOSINOPHIL # BLD AUTO: 0.19 X10(3) UL (ref 0–0.7)
EOSINOPHIL NFR BLD AUTO: 3.1 %
ERYTHROCYTE [DISTWIDTH] IN BLOOD BY AUTOMATED COUNT: 15.9 % (ref 11–15)
GLOBULIN PLAS-MCNC: 3.6 G/DL (ref 2.8–4.4)
GLUCOSE BLD-MCNC: 108 MG/DL (ref 70–99)
HCT VFR BLD AUTO: 34.8 % (ref 35–48)
HGB BLD-MCNC: 10.2 G/DL (ref 12–16)
IGA SERPL-MCNC: 225 MG/DL (ref 70–312)
IMM GRANULOCYTES # BLD AUTO: 0.02 X10(3) UL (ref 0–1)
IMM GRANULOCYTES NFR BLD: 0.3 %
IRON SATURATION: 7 % (ref 15–50)
IRON SERPL-MCNC: 34 UG/DL (ref 50–170)
LIPASE SERPL-CCNC: 103 U/L (ref 73–393)
LYMPHOCYTES # BLD AUTO: 1.61 X10(3) UL (ref 1–4)
LYMPHOCYTES NFR BLD AUTO: 26.7 %
M PROTEIN MFR SERPL ELPH: 7.5 G/DL (ref 6.4–8.2)
MCH RBC QN AUTO: 25.6 PG (ref 26–34)
MCHC RBC AUTO-ENTMCNC: 29.3 G/DL (ref 31–37)
MCV RBC AUTO: 87.2 FL (ref 80–100)
MONOCYTES # BLD AUTO: 0.44 X10(3) UL (ref 0.1–1)
MONOCYTES NFR BLD AUTO: 7.3 %
NEUTROPHILS # BLD AUTO: 3.74 X10 (3) UL (ref 1.5–7.7)
NEUTROPHILS # BLD AUTO: 3.74 X10(3) UL (ref 1.5–7.7)
NEUTROPHILS NFR BLD AUTO: 61.9 %
OSMOLALITY SERPL CALC.SUM OF ELEC: 288 MOSM/KG (ref 275–295)
PLATELET # BLD AUTO: 306 10(3)UL (ref 150–450)
POTASSIUM SERPL-SCNC: 4.1 MMOL/L (ref 3.5–5.1)
RBC # BLD AUTO: 3.99 X10(6)UL (ref 3.8–5.3)
SODIUM SERPL-SCNC: 139 MMOL/L (ref 136–145)
T4 FREE SERPL-MCNC: 1.1 NG/DL (ref 0.8–1.7)
TOTAL IRON BINDING CAPACITY: 471 UG/DL (ref 240–450)
TRANSFERRIN SERPL-MCNC: 316 MG/DL (ref 200–360)
TSI SER-ACNC: 0.82 MIU/ML (ref 0.36–3.74)
VIT B12 SERPL-MCNC: 851 PG/ML (ref 193–986)
WBC # BLD AUTO: 6 X10(3) UL (ref 4–11)

## 2019-03-06 PROCEDURE — 85025 COMPLETE CBC W/AUTO DIFF WBC: CPT

## 2019-03-06 PROCEDURE — 82607 VITAMIN B-12: CPT

## 2019-03-06 PROCEDURE — 83036 HEMOGLOBIN GLYCOSYLATED A1C: CPT | Performed by: FAMILY MEDICINE

## 2019-03-06 PROCEDURE — 80053 COMPREHEN METABOLIC PANEL: CPT

## 2019-03-06 PROCEDURE — 83550 IRON BINDING TEST: CPT

## 2019-03-06 PROCEDURE — 82728 ASSAY OF FERRITIN: CPT

## 2019-03-06 PROCEDURE — 82784 ASSAY IGA/IGD/IGG/IGM EACH: CPT

## 2019-03-06 PROCEDURE — 86003 ALLG SPEC IGE CRUDE XTRC EA: CPT

## 2019-03-06 PROCEDURE — 83540 ASSAY OF IRON: CPT

## 2019-03-06 PROCEDURE — 86256 FLUORESCENT ANTIBODY TITER: CPT

## 2019-03-06 PROCEDURE — 83516 IMMUNOASSAY NONANTIBODY: CPT

## 2019-03-06 PROCEDURE — 36415 COLL VENOUS BLD VENIPUNCTURE: CPT

## 2019-03-06 PROCEDURE — 84443 ASSAY THYROID STIM HORMONE: CPT

## 2019-03-06 PROCEDURE — 84439 ASSAY OF FREE THYROXINE: CPT

## 2019-03-06 PROCEDURE — 83690 ASSAY OF LIPASE: CPT

## 2019-03-06 NOTE — TELEPHONE ENCOUNTER
Pt stopped in to have her labs done and she wanted Matt Baer to know that her digestive issues are getting better. She is on Benefiber twice a day and a Probiotic once a day. She has been eating right and she is still bleeding (day 16).   She is going to m

## 2019-03-07 ENCOUNTER — TELEPHONE (OUTPATIENT)
Dept: FAMILY MEDICINE CLINIC | Facility: CLINIC | Age: 44
End: 2019-03-07

## 2019-03-07 ENCOUNTER — TELEPHONE (OUTPATIENT)
Dept: OBGYN CLINIC | Facility: CLINIC | Age: 44
End: 2019-03-07

## 2019-03-07 DIAGNOSIS — R73.9 HYPERGLYCEMIA: ICD-10-CM

## 2019-03-07 DIAGNOSIS — Z86.39 HISTORY OF IRON DEFICIENCY: Primary | ICD-10-CM

## 2019-03-07 LAB
EST. AVERAGE GLUCOSE BLD GHB EST-MCNC: 94 MG/DL (ref 68–126)
GLIAD (DEAMIDATED) AB, IGA: NEGATIVE
GLIAD (DEAMIDATED) AB, IGG: NEGATIVE
HBA1C MFR BLD HPLC: 4.9 % (ref ?–5.7)
TISSUE TRANSGLUTAMINASE AB,IGA: <1.2 U/ML (ref ?–15)
TISSUE TRANSGLUTAMINASE IGA QUALITATIVE: NEGATIVE

## 2019-03-07 NOTE — TELEPHONE ENCOUNTER
Informed patient of results, she started taking Ferrous sulfate and she will start taking OTC Vitamin C 500mg. She verbalized understanding of instructions and she will return for cbc recheck in 1 month and again in 3 months.    All questions answered, no

## 2019-03-07 NOTE — PROGRESS NOTES
Hemoglobin and iron are back down again take start ferrous sulfate @ 325 mg (65 mg of elemental iron) orally twice a day. Take with vitamin C 500 mg to help absorption. .  CBC in 1 month and repeat Iron and CBC in 3 months   Still awaiting the celiac and

## 2019-03-07 NOTE — TELEPHONE ENCOUNTER
37year old patient complaining of prolonged vaginal bleeding  Last OV date: 8/10/18  Recent Test/Labs: n/a   Recommendations Pt reports menses was q 28 days x 7 days since last appt, until most recent lmp starting on 2/17/19.   Pt still bleeding; varies b

## 2019-03-07 NOTE — TELEPHONE ENCOUNTER
----- Message from ePtr Castro PA-C sent at 3/6/2019  9:11 PM CST -----  Hemoglobin and iron are back down again take start ferrous sulfate @ 325 mg (65 mg of elemental iron) orally twice a day. Take with vitamin C 500 mg to help absorption. .  CBC

## 2019-03-08 LAB
ALLERGEN,  SHRIMP IGE: <0.1 KU/L
ALLERGEN, CLAM IGE: <0.1 KU/L
ALLERGEN, CODFISH: <0.1 KU/L
ALLERGEN, CORN IGE: <0.1 KU/L
ALLERGEN, EGG WHITE IGE: <0.1 KU/L
ALLERGEN, MILK (COW) IGE: <0.1 KU/L
ALLERGEN, PEANUT IGE: <0.1 KU/L
ALLERGEN, SCALLOP IGE: <0.1 KU/L
ALLERGEN, SOYBEAN IGE: <0.1 KU/L
ALLERGEN, WALNUT/BLACK WALNUT: <0.1 KU/L
ALLERGEN, WHEAT IGE: <0.1 KU/L
IMMUNOGLOBULIN E: 26 KU/L

## 2019-03-08 NOTE — TELEPHONE ENCOUNTER
Patient would like to speak to a nurse because she would like a sooner appt.  She would like to get in sooner then later

## 2019-03-08 NOTE — TELEPHONE ENCOUNTER
Pt wanted to get in next week to be seen before next cycle. Pt advised there are no appts available. Pt will keep appt scheduled for 3/25/19, but would like to start Lysteda with next cycle due on 3/17/19.   Lysteda was discussed with Dr. Bakari Saldana at last

## 2019-03-11 ENCOUNTER — TELEPHONE (OUTPATIENT)
Dept: FAMILY MEDICINE CLINIC | Facility: CLINIC | Age: 44
End: 2019-03-11

## 2019-03-11 RX ORDER — TRANEXAMIC ACID 650 1/1
1300 TABLET ORAL 3 TIMES DAILY
Qty: 30 TABLET | Refills: 0 | Status: SHIPPED | OUTPATIENT
Start: 2019-03-11 | End: 2019-04-16

## 2019-03-11 NOTE — TELEPHONE ENCOUNTER
Per Dr. Susie Pop [facility] for Lysteda 1300 mg TID during menses, 3-5 days. Med ordered. Patient notified and reviewed instructions. Patient verbalized understanding.

## 2019-03-11 NOTE — TELEPHONE ENCOUNTER
----- Message from Elena Aguila PA-C sent at 3/9/2019  7:58 AM CST -----  Celiac disease panel and food allergy panel are negative    lmom for pt with test results. Asked pt after reviewing message to call office with any questions.

## 2019-03-25 ENCOUNTER — OFFICE VISIT (OUTPATIENT)
Dept: OBGYN CLINIC | Facility: CLINIC | Age: 44
End: 2019-03-25

## 2019-03-25 VITALS
HEIGHT: 65.5 IN | HEART RATE: 80 BPM | SYSTOLIC BLOOD PRESSURE: 126 MMHG | WEIGHT: 174.63 LBS | BODY MASS INDEX: 28.75 KG/M2 | DIASTOLIC BLOOD PRESSURE: 84 MMHG

## 2019-03-25 DIAGNOSIS — D50.0 IRON DEFICIENCY ANEMIA DUE TO CHRONIC BLOOD LOSS: ICD-10-CM

## 2019-03-25 DIAGNOSIS — Z12.39 BREAST CANCER SCREENING: ICD-10-CM

## 2019-03-25 DIAGNOSIS — D25.1 INTRAMURAL AND SUBSEROUS LEIOMYOMA OF UTERUS: Primary | ICD-10-CM

## 2019-03-25 DIAGNOSIS — D25.2 INTRAMURAL AND SUBSEROUS LEIOMYOMA OF UTERUS: Primary | ICD-10-CM

## 2019-03-25 DIAGNOSIS — N92.0 MENORRHAGIA WITH REGULAR CYCLE: ICD-10-CM

## 2019-03-25 PROCEDURE — 99213 OFFICE O/P EST LOW 20 MIN: CPT | Performed by: OBSTETRICS & GYNECOLOGY

## 2019-03-25 NOTE — PROGRESS NOTES
Elysia Phipps is a 37year old female. HPI:   Patient presents with: Other: Pt is here to recheck fibroids. No menses since the prolonged episode early this year.   Wondering about menopause  Would like to have labs checked, u/s   Needs mammos Allergies      ROS:   As above         ASSESSMENT/PLAN:   Assessment     1. Intramural and subserous leiomyoma of uterus    2. Menorrhagia with regular cycle    3. Iron deficiency anemia due to chronic blood loss    4.  Breast cancer screening        See or

## 2019-04-08 ENCOUNTER — LABORATORY ENCOUNTER (OUTPATIENT)
Dept: LAB | Age: 44
End: 2019-04-08
Attending: FAMILY MEDICINE
Payer: COMMERCIAL

## 2019-04-08 ENCOUNTER — HOSPITAL ENCOUNTER (EMERGENCY)
Facility: HOSPITAL | Age: 44
Discharge: STILL ADMITTED OR ADMINISTRATIVE | End: 2019-04-12
Payer: COMMERCIAL

## 2019-04-08 DIAGNOSIS — N92.0 MENORRHAGIA WITH REGULAR CYCLE: ICD-10-CM

## 2019-04-08 DIAGNOSIS — Z86.39 HISTORY OF IRON DEFICIENCY: ICD-10-CM

## 2019-04-08 DIAGNOSIS — D50.0 IRON DEFICIENCY ANEMIA DUE TO CHRONIC BLOOD LOSS: ICD-10-CM

## 2019-04-08 PROCEDURE — 82670 ASSAY OF TOTAL ESTRADIOL: CPT

## 2019-04-08 PROCEDURE — 83001 ASSAY OF GONADOTROPIN (FSH): CPT

## 2019-04-08 PROCEDURE — 85025 COMPLETE CBC W/AUTO DIFF WBC: CPT

## 2019-04-08 PROCEDURE — 36415 COLL VENOUS BLD VENIPUNCTURE: CPT

## 2019-04-08 PROCEDURE — 84443 ASSAY THYROID STIM HORMONE: CPT

## 2019-04-08 PROCEDURE — 83002 ASSAY OF GONADOTROPIN (LH): CPT

## 2019-04-10 ENCOUNTER — TELEPHONE (OUTPATIENT)
Dept: FAMILY MEDICINE CLINIC | Facility: CLINIC | Age: 44
End: 2019-04-10

## 2019-04-10 NOTE — TELEPHONE ENCOUNTER
----- Message from Kalani Frost PA-C sent at 4/8/2019  8:51 PM CDT -----  Hemoglobin and hematocrit are back to normal stay on iron. Repeat CBC and iron in June as planned.       Labs are already in for June.  lmom for pt with results/instructions

## 2019-04-16 ENCOUNTER — TELEPHONE (OUTPATIENT)
Dept: OBGYN CLINIC | Facility: CLINIC | Age: 44
End: 2019-04-16

## 2019-04-16 RX ORDER — TRANEXAMIC ACID 650 1/1
1300 TABLET ORAL 3 TIMES DAILY
Qty: 30 TABLET | Refills: 0 | Status: SHIPPED | OUTPATIENT
Start: 2019-04-16 | End: 2019-05-25

## 2019-04-16 NOTE — TELEPHONE ENCOUNTER
Last OV: 03/25/19  Last refill date: 03/11/19  Follow-up:   Next appt.: 06/03/19    Please advise if OK for refill.

## 2019-04-16 NOTE — TELEPHONE ENCOUNTER
Pt calling requesting refill on Tranexamic 650   To go to [address] [facility]   Date Time Provider Will Maldonado   4/19/2019 10:15 AM PF [facility] PF [facility]   6/3/2019 12:00 PM Shanta Christopher MD [facility] [address]

## 2019-04-19 ENCOUNTER — HOSPITAL ENCOUNTER (OUTPATIENT)
Dept: ULTRASOUND IMAGING | Age: 44
Discharge: HOME | End: 2019-04-19
Attending: OBSTETRICS & GYNECOLOGY
Payer: COMMERCIAL

## 2019-04-19 DIAGNOSIS — D50.0 IRON DEFICIENCY ANEMIA DUE TO CHRONIC BLOOD LOSS: ICD-10-CM

## 2019-04-19 DIAGNOSIS — D25.2 INTRAMURAL AND SUBSEROUS LEIOMYOMA OF UTERUS: ICD-10-CM

## 2019-04-19 DIAGNOSIS — N92.0 MENORRHAGIA WITH REGULAR CYCLE: ICD-10-CM

## 2019-04-19 DIAGNOSIS — D25.1 INTRAMURAL AND SUBSEROUS LEIOMYOMA OF UTERUS: ICD-10-CM

## 2019-04-19 PROCEDURE — 76856 US EXAM PELVIC COMPLETE: CPT | Performed by: OBSTETRICS & GYNECOLOGY

## 2019-04-19 PROCEDURE — 76830 TRANSVAGINAL US NON-OB: CPT | Performed by: OBSTETRICS & GYNECOLOGY

## 2019-05-29 RX ORDER — TRANEXAMIC ACID 650 1/1
TABLET ORAL
Qty: 30 TABLET | Refills: 0 | Status: SHIPPED | OUTPATIENT
Start: 2019-05-29 | End: 2019-07-18

## 2019-07-18 ENCOUNTER — OFFICE VISIT (OUTPATIENT)
Dept: OBGYN CLINIC | Facility: CLINIC | Age: 44
End: 2019-07-18

## 2019-07-18 ENCOUNTER — TELEPHONE (OUTPATIENT)
Dept: OBGYN CLINIC | Facility: CLINIC | Age: 44
End: 2019-07-18

## 2019-07-18 VITALS
WEIGHT: 172 LBS | SYSTOLIC BLOOD PRESSURE: 116 MMHG | HEIGHT: 65.5 IN | DIASTOLIC BLOOD PRESSURE: 78 MMHG | HEART RATE: 74 BPM | BODY MASS INDEX: 28.31 KG/M2

## 2019-07-18 DIAGNOSIS — D50.0 IRON DEFICIENCY ANEMIA DUE TO CHRONIC BLOOD LOSS: ICD-10-CM

## 2019-07-18 DIAGNOSIS — D25.2 INTRAMURAL AND SUBSEROUS LEIOMYOMA OF UTERUS: ICD-10-CM

## 2019-07-18 DIAGNOSIS — D25.2 INTRAMURAL AND SUBSEROUS LEIOMYOMA OF UTERUS: Primary | ICD-10-CM

## 2019-07-18 DIAGNOSIS — D25.1 INTRAMURAL AND SUBSEROUS LEIOMYOMA OF UTERUS: Primary | ICD-10-CM

## 2019-07-18 DIAGNOSIS — Z01.419 WELL FEMALE EXAM WITH ROUTINE GYNECOLOGICAL EXAM: Primary | ICD-10-CM

## 2019-07-18 DIAGNOSIS — N92.0 MENORRHAGIA WITH REGULAR CYCLE: ICD-10-CM

## 2019-07-18 DIAGNOSIS — D50.0 IRON DEFICIENCY ANEMIA SECONDARY TO BLOOD LOSS (CHRONIC): ICD-10-CM

## 2019-07-18 DIAGNOSIS — Z12.4 CERVICAL CANCER SCREENING: ICD-10-CM

## 2019-07-18 DIAGNOSIS — D25.1 INTRAMURAL AND SUBSEROUS LEIOMYOMA OF UTERUS: ICD-10-CM

## 2019-07-18 PROCEDURE — 87624 HPV HI-RISK TYP POOLED RSLT: CPT | Performed by: OBSTETRICS & GYNECOLOGY

## 2019-07-18 PROCEDURE — 99396 PREV VISIT EST AGE 40-64: CPT | Performed by: OBSTETRICS & GYNECOLOGY

## 2019-07-18 PROCEDURE — 88175 CYTOPATH C/V AUTO FLUID REDO: CPT | Performed by: OBSTETRICS & GYNECOLOGY

## 2019-07-18 RX ORDER — TRANEXAMIC ACID 650 1/1
TABLET ORAL
Qty: 30 TABLET | Refills: 11 | Status: SHIPPED | OUTPATIENT
Start: 2019-07-18 | End: 2020-12-23

## 2019-07-18 NOTE — PATIENT INSTRUCTIONS
Uterine Fibroid Embolization    Uterine fibroid embolization is a procedure that is done to shrink a fibroid. Fibroids are benign (not cancerous) growths of muscle tissue on or inside the uterus. This procedure stops the fibroid’s blood supply.  It is oft · The radiologist uses X-ray images as a guide. He or she moves the catheter through the blood vessel. It is moved into the artery that supplies blood to your uterus. · The catheter is moved near the fibroid.  The radiologist then injects tiny grains of pl © 5393-2587 [employer] [address]. [address] [facility], [address]. All rights reserved. This information is not intended as a substitute for professional medical care. Always follow your healthcare professional's instructions.         Christopher Rodriguez · Take your temperature and check your incision site every day for a week. Look for signs of infection such as redness, swelling, or warmth.  Tell your healthcare provider if you have any of these symptoms.   · Ask your healthcare provider when it is safe t

## 2019-07-18 NOTE — TELEPHONE ENCOUNTER
Sky Lara MD  P [facility] [place] Nurse             Referral for pelvic mri, for interventional radiology consult      Referral entered.  Hold for approval.

## 2019-07-18 NOTE — PROGRESS NOTES
GYN H&P     7/18/2019  11:21 AM    CC: Patient is here for annual    HPI: patient is a 53year old [id number] here for her annual gyn exam.   She has complaints of long menses that  can be irregular. jason is helping but thinks she is interested in [place].  Benito MG Oral Tab EC Take 325 mg by mouth daily with breakfast. Disp:  Rfl:      No current facility-administered medications on file prior to visit.    Family History   Problem Relation Age of Onset   • Asthma Mother    • Diabetes Father         DM   • Other (li Concerns:         Service: Not Asked        Blood Transfusions: Not Asked        Caffeine Concern: No        Occupational Exposure: Not Asked        Hobby Hazards: Not Asked        Sleep Concern: Not Asked        Stress Concern: Not Asked        We nontendder, no palpable masses or nodes, no nipple discharge, no skin changes, no axillary adenopathy  ABDOMEN: Soft, non distended; non tender, uterus palpable  GYNE/: External Genitalia: Normal appearing, no lesions.  Urethral meatus appear wnl, no abno TAKE 2 TABLETS BY MOUTH THREE TIMES DAILY FOR 5 DAYS. TAKE FOR ATLEAST 3 DAYS, BUT NO MORE THAN 5          Dispense:  30 tablet          Refill:  11    4.  Iron deficiency anemia due to chronic blood loss    - MRI PELVIS (SOFT TISSUE)(CONTRAST ONLY) (CPT=72

## 2019-07-19 LAB — HPV I/H RISK 1 DNA SPEC QL NAA+PROBE: NEGATIVE

## 2019-07-25 NOTE — TELEPHONE ENCOUNTER
Call from MRI department; pt scheduled for MRI on Monday. Order needs to be MRI pelvis soft tissue with and without contrast.  Routed to Dr. Gilbert Jones. Please advise if ok to place new order. Will have to obtain insurance authorization on new CPT code.

## 2019-07-29 ENCOUNTER — HOSPITAL ENCOUNTER (OUTPATIENT)
Dept: MRI IMAGING | Age: 44
Discharge: HOME | End: 2019-07-29
Attending: OBSTETRICS & GYNECOLOGY
Payer: COMMERCIAL

## 2019-07-29 DIAGNOSIS — N92.0 MENORRHAGIA WITH REGULAR CYCLE: ICD-10-CM

## 2019-07-29 DIAGNOSIS — D25.2 INTRAMURAL AND SUBSEROUS LEIOMYOMA OF UTERUS: ICD-10-CM

## 2019-07-29 DIAGNOSIS — D25.1 INTRAMURAL AND SUBSEROUS LEIOMYOMA OF UTERUS: ICD-10-CM

## 2019-07-29 DIAGNOSIS — D50.0 IRON DEFICIENCY ANEMIA SECONDARY TO BLOOD LOSS (CHRONIC): ICD-10-CM

## 2019-07-29 PROCEDURE — 72197 MRI PELVIS W/O & W/DYE: CPT | Performed by: OBSTETRICS & GYNECOLOGY

## 2019-07-29 PROCEDURE — A9575 INJ GADOTERATE MEGLUMI 0.1ML: HCPCS | Performed by: OBSTETRICS & GYNECOLOGY

## 2019-08-06 NOTE — IMAGING NOTE
Pt seen in consultation for uterine fibroids. H/o heavy menses, lasting up to 18 days, and anemia on Fe supplements. Procedure and alternatives discussed at length. Review of what to expect day of, overnight observation and recovery.   Benefits, alternat

## 2019-08-12 ENCOUNTER — HOSPITAL ENCOUNTER (OUTPATIENT)
Dept: MAMMOGRAPHY | Age: 44
Discharge: HOME | End: 2019-08-12
Attending: OBSTETRICS & GYNECOLOGY
Payer: COMMERCIAL

## 2019-08-12 DIAGNOSIS — Z12.39 BREAST CANCER SCREENING: ICD-10-CM

## 2019-08-12 PROCEDURE — 77067 SCR MAMMO BI INCL CAD: CPT | Performed by: OBSTETRICS & GYNECOLOGY

## 2019-08-12 PROCEDURE — 77063 BREAST TOMOSYNTHESIS BI: CPT | Performed by: OBSTETRICS & GYNECOLOGY

## 2019-08-20 ENCOUNTER — TELEPHONE (OUTPATIENT)
Dept: OBGYN CLINIC | Facility: CLINIC | Age: 44
End: 2019-08-20

## 2019-08-20 DIAGNOSIS — D21.9 FIBROIDS: Primary | ICD-10-CM

## 2019-08-20 NOTE — TELEPHONE ENCOUNTER
Can you please but an order in for a uterine fibroid embolization. Patient will be coming in, in October.

## 2019-08-20 NOTE — TELEPHONE ENCOUNTER
Per review of chart, patient had consultation with Dr. May Guadarrama in radiology on 8/6/19. Per his note, no contraindications for Saint Narinder. Routed to Dr. Susie Pop for review and ok for order, if appropriate.

## 2019-09-18 ENCOUNTER — LAB ENCOUNTER (OUTPATIENT)
Dept: LAB | Age: 44
End: 2019-09-18
Attending: FAMILY MEDICINE
Payer: COMMERCIAL

## 2019-09-18 DIAGNOSIS — Z86.39 HISTORY OF IRON DEFICIENCY: ICD-10-CM

## 2019-09-18 DIAGNOSIS — D21.9 FIBROIDS: ICD-10-CM

## 2019-09-18 LAB
ANION GAP SERPL CALC-SCNC: 4 MMOL/L (ref 0–18)
BASOPHILS # BLD AUTO: 0.04 X10(3) UL (ref 0–0.2)
BASOPHILS NFR BLD AUTO: 0.7 %
BUN BLD-MCNC: 9 MG/DL (ref 7–18)
BUN/CREAT SERPL: 11.8 (ref 10–20)
CALCIUM BLD-MCNC: 8.6 MG/DL (ref 8.5–10.1)
CHLORIDE SERPL-SCNC: 109 MMOL/L (ref 98–112)
CO2 SERPL-SCNC: 27 MMOL/L (ref 21–32)
CREAT BLD-MCNC: 0.76 MG/DL (ref 0.55–1.02)
DEPRECATED RDW RBC AUTO: 46.5 FL (ref 35.1–46.3)
EOSINOPHIL # BLD AUTO: 0.12 X10(3) UL (ref 0–0.7)
EOSINOPHIL NFR BLD AUTO: 2 %
ERYTHROCYTE [DISTWIDTH] IN BLOOD BY AUTOMATED COUNT: 14.4 % (ref 11–15)
GLUCOSE BLD-MCNC: 99 MG/DL (ref 70–99)
HCT VFR BLD AUTO: 38.8 % (ref 35–48)
HGB BLD-MCNC: 11.7 G/DL (ref 12–16)
IMM GRANULOCYTES # BLD AUTO: 0.01 X10(3) UL (ref 0–1)
IMM GRANULOCYTES NFR BLD: 0.2 %
IRON SATURATION: 59 % (ref 15–50)
IRON SERPL-MCNC: 235 UG/DL (ref 50–170)
LYMPHOCYTES # BLD AUTO: 1.65 X10(3) UL (ref 1–4)
LYMPHOCYTES NFR BLD AUTO: 27.5 %
MCH RBC QN AUTO: 26.7 PG (ref 26–34)
MCHC RBC AUTO-ENTMCNC: 30.2 G/DL (ref 31–37)
MCV RBC AUTO: 88.4 FL (ref 80–100)
MONOCYTES # BLD AUTO: 0.49 X10(3) UL (ref 0.1–1)
MONOCYTES NFR BLD AUTO: 8.2 %
NEUTROPHILS # BLD AUTO: 3.68 X10 (3) UL (ref 1.5–7.7)
NEUTROPHILS # BLD AUTO: 3.68 X10(3) UL (ref 1.5–7.7)
NEUTROPHILS NFR BLD AUTO: 61.4 %
OSMOLALITY SERPL CALC.SUM OF ELEC: 289 MOSM/KG (ref 275–295)
PLATELET # BLD AUTO: 291 10(3)UL (ref 150–450)
POTASSIUM SERPL-SCNC: 4.2 MMOL/L (ref 3.5–5.1)
RBC # BLD AUTO: 4.39 X10(6)UL (ref 3.8–5.3)
SODIUM SERPL-SCNC: 140 MMOL/L (ref 136–145)
TOTAL IRON BINDING CAPACITY: 395 UG/DL (ref 240–450)
TRANSFERRIN SERPL-MCNC: 265 MG/DL (ref 200–360)
WBC # BLD AUTO: 6 X10(3) UL (ref 4–11)

## 2019-09-18 PROCEDURE — 80048 BASIC METABOLIC PNL TOTAL CA: CPT

## 2019-09-18 PROCEDURE — 85025 COMPLETE CBC W/AUTO DIFF WBC: CPT

## 2019-09-18 PROCEDURE — 83540 ASSAY OF IRON: CPT

## 2019-09-18 PROCEDURE — 83550 IRON BINDING TEST: CPT

## 2019-09-18 PROCEDURE — 36415 COLL VENOUS BLD VENIPUNCTURE: CPT

## 2019-09-19 NOTE — PROGRESS NOTES
Iron levels are high you can discontinue iron at this point. If you do get a heavy bleed you may want to take the iron for 3 to 5 days during the bleeding.

## 2019-09-24 NOTE — TELEPHONE ENCOUNTER
Patient has question on medication she is taking .  She is having  Procedure and wants to know if ok to take

## 2019-09-24 NOTE — TELEPHONE ENCOUNTER
Patient is having Saint Narinder and is taking Tranexamic Acid. She stated the radiologist said it was fine to keep taking but she wanted to double check with our office. Advised patient it would be the radiologist's call since they are doing procedure.  She verbalize

## 2019-10-02 ENCOUNTER — TELEPHONE (OUTPATIENT)
Dept: OBGYN CLINIC | Facility: CLINIC | Age: 44
End: 2019-10-02

## 2019-10-02 NOTE — TELEPHONE ENCOUNTER
Insurance Verification calling on Dr. Jayce Gregory procedure tomorrow. There is a referral in the system but not finished. Can we finish?     Please nallely URGENT and they will watch

## 2019-10-03 ENCOUNTER — HOSPITAL ENCOUNTER (INPATIENT)
Dept: INTERVENTIONAL RADIOLOGY/VASCULAR | Facility: HOSPITAL | Age: 44
LOS: 1 days | Discharge: HOME | DRG: 750 | End: 2019-10-04
Attending: OBSTETRICS & GYNECOLOGY | Admitting: OBSTETRICS & GYNECOLOGY
Payer: COMMERCIAL

## 2019-10-03 DIAGNOSIS — D21.9 FIBROIDS: ICD-10-CM

## 2019-10-03 PROCEDURE — B41CYZZ FLUOROSCOPY OF PELVIC ARTERIES USING OTHER CONTRAST: ICD-10-PCS | Performed by: RADIOLOGY

## 2019-10-03 PROCEDURE — B410YZZ FLUOROSCOPY OF ABDOMINAL AORTA USING OTHER CONTRAST: ICD-10-PCS | Performed by: RADIOLOGY

## 2019-10-03 PROCEDURE — 04LE3DT OCCLUSION OF RIGHT UTERINE ARTERY WITH INTRALUMINAL DEVICE, PERCUTANEOUS APPROACH: ICD-10-PCS | Performed by: RADIOLOGY

## 2019-10-03 PROCEDURE — 99233 SBSQ HOSP IP/OBS HIGH 50: CPT | Performed by: HOSPITALIST

## 2019-10-03 PROCEDURE — 04LF3DU OCCLUSION OF LEFT UTERINE ARTERY WITH INTRALUMINAL DEVICE, PERCUTANEOUS APPROACH: ICD-10-PCS | Performed by: RADIOLOGY

## 2019-10-03 RX ORDER — NALBUPHINE HCL 10 MG/ML
2.5 AMPUL (ML) INJECTION EVERY 4 HOURS PRN
Status: DISCONTINUED | OUTPATIENT
Start: 2019-10-03 | End: 2019-10-04

## 2019-10-03 RX ORDER — ONDANSETRON 2 MG/ML
4 INJECTION INTRAMUSCULAR; INTRAVENOUS EVERY 4 HOURS PRN
Status: DISCONTINUED | OUTPATIENT
Start: 2019-10-03 | End: 2019-10-04

## 2019-10-03 RX ORDER — LIDOCAINE HYDROCHLORIDE 10 MG/ML
INJECTION, SOLUTION INFILTRATION; PERINEURAL
Status: COMPLETED
Start: 2019-10-03 | End: 2019-10-03

## 2019-10-03 RX ORDER — DIPHENHYDRAMINE HYDROCHLORIDE 50 MG/ML
12.5 INJECTION INTRAMUSCULAR; INTRAVENOUS EVERY 4 HOURS PRN
Status: DISCONTINUED | OUTPATIENT
Start: 2019-10-03 | End: 2019-10-04

## 2019-10-03 RX ORDER — ONDANSETRON 2 MG/ML
4 INJECTION INTRAMUSCULAR; INTRAVENOUS EVERY 6 HOURS PRN
Status: DISCONTINUED | OUTPATIENT
Start: 2019-10-03 | End: 2019-10-03

## 2019-10-03 RX ORDER — HEPARIN SODIUM 5000 [USP'U]/ML
INJECTION, SOLUTION INTRAVENOUS; SUBCUTANEOUS
Status: COMPLETED
Start: 2019-10-03 | End: 2019-10-03

## 2019-10-03 RX ORDER — MIDAZOLAM HYDROCHLORIDE 1 MG/ML
INJECTION INTRAMUSCULAR; INTRAVENOUS
Status: COMPLETED
Start: 2019-10-03 | End: 2019-10-03

## 2019-10-03 RX ORDER — NALOXONE HYDROCHLORIDE 0.4 MG/ML
0.08 INJECTION, SOLUTION INTRAMUSCULAR; INTRAVENOUS; SUBCUTANEOUS
Status: DISCONTINUED | OUTPATIENT
Start: 2019-10-03 | End: 2019-10-04

## 2019-10-03 RX ORDER — HYDROMORPHONE HYDROCHLORIDE 1 MG/ML
INJECTION, SOLUTION INTRAMUSCULAR; INTRAVENOUS; SUBCUTANEOUS
Status: COMPLETED
Start: 2019-10-03 | End: 2019-10-03

## 2019-10-03 RX ORDER — KETOROLAC TROMETHAMINE 30 MG/ML
INJECTION, SOLUTION INTRAMUSCULAR; INTRAVENOUS
Status: COMPLETED
Start: 2019-10-03 | End: 2019-10-03

## 2019-10-03 RX ORDER — SODIUM CHLORIDE 9 MG/ML
INJECTION, SOLUTION INTRAVENOUS CONTINUOUS
Status: DISCONTINUED | OUTPATIENT
Start: 2019-10-03 | End: 2019-10-04

## 2019-10-03 RX ORDER — POLYETHYLENE GLYCOL 3350 17 G/17G
17 POWDER, FOR SOLUTION ORAL DAILY
Status: DISCONTINUED | OUTPATIENT
Start: 2019-10-03 | End: 2019-10-04

## 2019-10-03 RX ADMIN — ONDANSETRON 4 MG: 2 INJECTION INTRAMUSCULAR; INTRAVENOUS at 13:48:00

## 2019-10-03 RX ADMIN — ONDANSETRON 4 MG: 2 INJECTION INTRAMUSCULAR; INTRAVENOUS at 17:41:00

## 2019-10-03 RX ADMIN — SODIUM CHLORIDE: 9 INJECTION, SOLUTION INTRAVENOUS at 08:30:00

## 2019-10-03 RX ADMIN — HYDROMORPHONE HYDROCHLORIDE 0.4 MG: 1 INJECTION, SOLUTION INTRAMUSCULAR; INTRAVENOUS; SUBCUTANEOUS at 10:28:00

## 2019-10-03 NOTE — PROGRESS NOTES
Pt post UFE via right groin, manual pressure held to site in IR. Groin c/d/i & soft on arrival back to unit. All peripheral pulses intact & palp. VSS. Dilaudid PCA initiated. Pt &  given rationale and explanation for use. Pt rating pain 7/10.  Leonela Him

## 2019-10-03 NOTE — BH PROGRESS NOTE
Per pacu dr. Phebe Brittle consult hospitalist and was notified; text message sent to dr. Phebe Brittle per perfect serve

## 2019-10-03 NOTE — PROGRESS NOTES
Transferred by bed with transport, myself and family to [id number]. Kira RIZVI met us at the bedside and assessed the Right groin. No bleeding or hematoma, sleeping on and off, easily awakens with conversation.

## 2019-10-03 NOTE — IMAGING NOTE
S/p svitlana [place] for symptomatic fibroids. Sister present in room    She has tolerated liquids and is up walking to the BR. Improving nausea. No c/o's. Satisfactory progress. Advance diet.     Anticipate dc in am.    Consider 30 mg iv Toradol before dc to

## 2019-10-03 NOTE — PLAN OF CARE
1220-Admitted from pacu  post  uterine fibroid embolization  Patient sl drowsy  arousable by calling name follows commands  Received w/ dilaudid pca   Sinus shyann on tele  02 sat stable hi 90's     Deny dyspnea  Problem: PAIN - ADULT  Goal: Verbalizes/d

## 2019-10-03 NOTE — CONSULTS
[facility] Pharmacy Note:  Pain Consult    Olaf Yuen is a 53year old female started on Dilaudid PCA by Dr. Bruno Nelson. Pharmacy was consulted to review medication profile and to discontinue previously ordered narcotics and sedatives.     Medication profil

## 2019-10-03 NOTE — CONSULTS
[facility] Patient Status:  Inpatient    1975 MRN [id number]   [facility] 2NE-A Attending Efra Guzman MD   Hosp Day # 0 PCP Janey Hurt DO     Reason for consult: Medical Management (PROBIOTIC OR) Take by mouth. Disp:  Rfl:    Tranexamic Acid 650 MG Oral Tab TAKE 2 TABLETS BY MOUTH THREE TIMES DAILY FOR 5 DAYS.  TAKE FOR ATLEAST 3 DAYS, BUT NO MORE THAN 5 Disp: 30 tablet Rfl: 11       Review of Systems:   A comprehensive 14 point revie

## 2019-10-04 VITALS
OXYGEN SATURATION: 98 % | RESPIRATION RATE: 18 BRPM | HEART RATE: 66 BPM | TEMPERATURE: 99 F | SYSTOLIC BLOOD PRESSURE: 120 MMHG | DIASTOLIC BLOOD PRESSURE: 60 MMHG

## 2019-10-04 PROCEDURE — 99231 SBSQ HOSP IP/OBS SF/LOW 25: CPT | Performed by: HOSPITALIST

## 2019-10-04 RX ORDER — IBUPROFEN 800 MG/1
800 TABLET ORAL 2 TIMES DAILY
Qty: 10 TABLET | Refills: 0 | Status: SHIPPED | OUTPATIENT
Start: 2019-10-04 | End: 2019-10-09

## 2019-10-04 RX ORDER — IBUPROFEN 400 MG/1
800 TABLET ORAL 2 TIMES DAILY PRN
Status: DISCONTINUED | OUTPATIENT
Start: 2019-10-04 | End: 2019-10-04

## 2019-10-04 RX ORDER — TRAMADOL HYDROCHLORIDE 50 MG/1
50 TABLET ORAL EVERY 6 HOURS PRN
Qty: 10 TABLET | Refills: 0 | Status: SHIPPED | OUTPATIENT
Start: 2019-10-04 | End: 2019-10-15

## 2019-10-04 RX ORDER — TRAMADOL HYDROCHLORIDE 50 MG/1
50 TABLET ORAL EVERY 6 HOURS PRN
Status: DISCONTINUED | OUTPATIENT
Start: 2019-10-04 | End: 2019-10-04

## 2019-10-04 RX ADMIN — POLYETHYLENE GLYCOL 3350 17 G: 17 POWDER, FOR SOLUTION ORAL at 08:18:00

## 2019-10-04 RX ADMIN — TRAMADOL HYDROCHLORIDE 50 MG: 50 TABLET ORAL at 11:17:00

## 2019-10-04 RX ADMIN — IBUPROFEN 800 MG: 400 TABLET ORAL at 12:23:00

## 2019-10-04 NOTE — DISCHARGE PLANNING
NURSING DISCHARGE NOTE  Education given to pt. And spouse. They demonstrated understanding. PIV removed and groin site dressing removed. Pt. Pleasant and eager to go home. Discharged Home via Wheelchair.   Accompanied by Spouse  Belongings Taken by britney

## 2019-10-04 NOTE — PROGRESS NOTES
[facility]ITAL[facility]  Progress note     Angela Gravely Patient Status:  Inpatient    1975 MRN [id number]   [facility] 2NE-A Attending Angelina Rollins MD   Hosp Day # 1 PCP Estefanía Dias DO     Reason for consult: Medical Manag

## 2019-10-04 NOTE — PLAN OF CARE
Problem: Patient/Family Goals  Goal: Patient/Family Long Term Goal  Description  Patient's Long Term Goal: will be able to go home without complication    Interventions:  -  - See additional Care Plan goals for specific interventions  Outcome: Progressin

## 2019-10-04 NOTE — PLAN OF CARE
Assumed care of pt. At 0730. Pt. Oriented and Alert x4. Pt. Sats 99% on RA. NSR. Denies nausea. Does not want to use the PCA dilaudid - prefers to take ibuprofen. Rates pain 5/10 in abdomen - described as \"sharp cramps. \" SBA.  OK to d/c once dilaudid pump

## 2019-10-04 NOTE — PROGRESS NOTES
[facility]  Progress Note      Maryellen Fitzgerald Patient Status:  Inpatient    1975 MRN [id number]   [facility] 2NE-A Attending Radhames Hazel MD   Hosp Day # 1 PCP Daphnie Duran,        Subjective:   Pt seen this AM.  Jamarcus Carranza

## 2019-10-15 ENCOUNTER — TELEPHONE (OUTPATIENT)
Dept: OBGYN CLINIC | Facility: CLINIC | Age: 44
End: 2019-10-15

## 2019-10-15 ENCOUNTER — OFFICE VISIT (OUTPATIENT)
Dept: FAMILY MEDICINE CLINIC | Facility: CLINIC | Age: 44
End: 2019-10-15

## 2019-10-15 VITALS
OXYGEN SATURATION: 97 % | SYSTOLIC BLOOD PRESSURE: 134 MMHG | DIASTOLIC BLOOD PRESSURE: 82 MMHG | TEMPERATURE: 99 F | BODY MASS INDEX: 27.67 KG/M2 | HEART RATE: 84 BPM | WEIGHT: 168.13 LBS | HEIGHT: 65.5 IN

## 2019-10-15 DIAGNOSIS — Z28.21 IMMUNIZATION NOT CARRIED OUT BECAUSE OF PATIENT REFUSAL: ICD-10-CM

## 2019-10-15 DIAGNOSIS — Z23 IMMUNIZATION DUE: ICD-10-CM

## 2019-10-15 DIAGNOSIS — R10.30 LOWER ABDOMINAL PAIN: Primary | ICD-10-CM

## 2019-10-15 PROCEDURE — 99215 OFFICE O/P EST HI 40 MIN: CPT | Performed by: FAMILY MEDICINE

## 2019-10-15 RX ORDER — LEVOFLOXACIN 500 MG/1
500 TABLET, FILM COATED ORAL DAILY
Qty: 7 TABLET | Refills: 0 | Status: SHIPPED | OUTPATIENT
Start: 2019-10-15 | End: 2019-11-01

## 2019-10-15 NOTE — TELEPHONE ENCOUNTER
VO from Dr. Cora Cm, Levaquin 500 mg tablet po qd #7, no refill. Detailed message left on VM, per FYI ok. To keep appt tomorrow and to call back if questions/concerns.

## 2019-10-15 NOTE — TELEPHONE ENCOUNTER
Spoke with Dr. Eve Alonzo, if no signs of infection, she recommends pain management with NSAIDs. Spoke with Dr. Kelle Kee. Reported above to him.  He reports that patient did not have a fever in the office today, but does have tenderness on palpation, more th

## 2019-10-15 NOTE — PROGRESS NOTES
Alfredito Teran is a 53year old female here for Patient presents with:  Fever: Patient has been having low fevers at nights x 2 weeks. Patient had uterine embolization 10/03/2019.   Patient has not scheduled appointment for follow up with Dr. Sadia MohrG History: Social History    Tobacco Use      Smoking status: Never Smoker      Smokeless tobacco: Never Used    Alcohol use: Not Currently      Alcohol/week: 0.0 - 1.0 standard drinks      Binge frequency: Never    Drug use: No       Medications (Active kaela watch out for - go to ER if symptoms severely worsen    2. Immunization due  3. Immunization not carried out because of patient refusal  - INFLUENZA REFUSED DMG          The patient is asked to return in prn.     Orders This Visit:  No orders of the defined

## 2019-10-15 NOTE — TELEPHONE ENCOUNTER
Dr. William Mcmahon seeing pt for acute visit today concerned about pts' low grade temp and abdominal pain. Wondering if doc would like him to order imaging or have pt be seen sooner w/ Yousuf Nance.     Please call him on backline # [phone]

## 2019-10-15 NOTE — TELEPHONE ENCOUNTER
Call to patient; no answer. Left message to call back. Dr. Alex Borja would like to know what her temperatures have been at home.

## 2019-10-16 ENCOUNTER — OFFICE VISIT (OUTPATIENT)
Dept: OBGYN CLINIC | Facility: CLINIC | Age: 44
End: 2019-10-16

## 2019-10-16 VITALS
DIASTOLIC BLOOD PRESSURE: 82 MMHG | HEART RATE: 72 BPM | SYSTOLIC BLOOD PRESSURE: 128 MMHG | TEMPERATURE: 98 F | BODY MASS INDEX: 27.65 KG/M2 | WEIGHT: 168 LBS | HEIGHT: 65.5 IN

## 2019-10-16 DIAGNOSIS — D21.9 FIBROIDS: Primary | ICD-10-CM

## 2019-10-16 DIAGNOSIS — D25.1 INTRAMURAL AND SUBSEROUS LEIOMYOMA OF UTERUS: ICD-10-CM

## 2019-10-16 DIAGNOSIS — D25.2 INTRAMURAL AND SUBSEROUS LEIOMYOMA OF UTERUS: ICD-10-CM
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
## 2019-10-16 DIAGNOSIS — N92.0 MENORRHAGIA WITH REGULAR CYCLE: ICD-10-CM

## 2019-10-16 PROCEDURE — 99213 OFFICE O/P EST LOW 20 MIN: CPT | Performed by: OBSTETRICS & GYNECOLOGY

## 2019-10-16 NOTE — PROGRESS NOTES
Severino Patricia is a 53year old female. HPI:   Patient presents with:   Follow - Up: After uterine embolization, stomach has felt better since last night , pt on levaquin     Notes a low grade temp   Never > 101  Last pm 99  Normal appet above    PHYSICAL EXAM:    abd : + bs  Sl tender at uterine fundus  No rebound, no guarding      ASSESSMENT/PLAN:   Assessment     1. Fibroids    2. Intramural and subserous leiomyoma of uterus    3. Menorrhagia with regular cycle    4.  Radiolog proc/radio

## 2019-10-17 ENCOUNTER — TELEPHONE (OUTPATIENT)
Dept: OBGYN CLINIC | Facility: CLINIC | Age: 44
End: 2019-10-17

## 2019-10-17 RX ORDER — CEPHALEXIN 500 MG/1
500 CAPSULE ORAL 2 TIMES DAILY
Qty: 14 CAPSULE | Refills: 0 | Status: SHIPPED | OUTPATIENT
Start: 2019-10-17 | End: 2019-10-24

## 2019-10-17 NOTE — TELEPHONE ENCOUNTER
54 y/o called c/o not sleeping for two nights. She believes it is because of Levaquin. She denies any other s/s. No other stressors or new things going on. She is taking Levaquin due to fever/abdominal pain after intra uterine embolization.    Last OV date:

## 2019-10-17 NOTE — TELEPHONE ENCOUNTER
Patient has been on Levofloxaein for two days and she has insomnia. She would like another medication if possible.  Please call to advise

## 2019-10-30 ENCOUNTER — TELEPHONE (OUTPATIENT)
Dept: OBGYN CLINIC | Facility: CLINIC | Age: 44
End: 2019-10-30

## 2019-10-30 NOTE — TELEPHONE ENCOUNTER
Wants to know if she can take tranexamic acid? She used to take this. Had an embolization done on 10/3 hasn't taken since then.

## 2019-11-01 ENCOUNTER — OFFICE VISIT (OUTPATIENT)
Dept: OBGYN CLINIC | Facility: CLINIC | Age: 44
End: 2019-11-01

## 2019-11-01 VITALS
SYSTOLIC BLOOD PRESSURE: 118 MMHG | DIASTOLIC BLOOD PRESSURE: 76 MMHG | WEIGHT: 172.38 LBS | HEART RATE: 68 BPM | BODY MASS INDEX: 28 KG/M2

## 2019-11-01 DIAGNOSIS — D25.1 INTRAMURAL AND SUBSEROUS LEIOMYOMA OF UTERUS: Primary | ICD-10-CM

## 2019-11-01 DIAGNOSIS — D25.2 INTRAMURAL AND SUBSEROUS LEIOMYOMA OF UTERUS: Primary | ICD-10-CM

## 2019-11-01 PROCEDURE — 99212 OFFICE O/P EST SF 10 MIN: CPT | Performed by: OBSTETRICS & GYNECOLOGY

## 2019-11-01 NOTE — PROGRESS NOTES
Marked improvement in discomfort    No complaints      Abd: soft nontender          Uterus nontender      A:  S/p [place]     P: 6 month mri when needed

## 2020-11-03 ENCOUNTER — ORDER TRANSCRIPTION (OUTPATIENT)
Dept: ADMINISTRATIVE | Facility: HOSPITAL | Age: 45
End: 2020-11-03

## 2020-11-03 DIAGNOSIS — Z12.31 ENCOUNTER FOR SCREENING MAMMOGRAM FOR MALIGNANT NEOPLASM OF BREAST: Primary | ICD-10-CM

## 2020-11-04 ENCOUNTER — HOSPITAL ENCOUNTER (OUTPATIENT)
Dept: MAMMOGRAPHY | Age: 45
Discharge: HOME | End: 2020-11-04
Attending: FAMILY MEDICINE
Payer: COMMERCIAL

## 2020-11-04 DIAGNOSIS — Z12.31 ENCOUNTER FOR SCREENING MAMMOGRAM FOR MALIGNANT NEOPLASM OF BREAST: ICD-10-CM

## 2020-11-04 PROCEDURE — 77067 SCR MAMMO BI INCL CAD: CPT | Performed by: FAMILY MEDICINE

## 2020-12-23 ENCOUNTER — OFFICE VISIT (OUTPATIENT)
Dept: FAMILY MEDICINE CLINIC | Facility: CLINIC | Age: 45
End: 2020-12-23

## 2020-12-23 VITALS
HEART RATE: 76 BPM | BODY MASS INDEX: 29.28 KG/M2 | TEMPERATURE: 97 F | WEIGHT: 180 LBS | DIASTOLIC BLOOD PRESSURE: 88 MMHG | SYSTOLIC BLOOD PRESSURE: 130 MMHG | HEIGHT: 65.67 IN

## 2020-12-23 DIAGNOSIS — Z00.00 WELL FEMALE EXAM WITHOUT GYNECOLOGICAL EXAM: Primary | ICD-10-CM

## 2020-12-23 DIAGNOSIS — Z00.00 LABORATORY EXAMINATION ORDERED AS PART OF A ROUTINE GENERAL MEDICAL EXAMINATION: ICD-10-CM

## 2020-12-23 DIAGNOSIS — D21.9 FIBROIDS: ICD-10-CM

## 2020-12-23 DIAGNOSIS — Z86.39 HISTORY OF IRON DEFICIENCY: ICD-10-CM

## 2020-12-23 DIAGNOSIS — E78.1 HYPERTRIGLYCERIDEMIA: ICD-10-CM

## 2020-12-23 DIAGNOSIS — D25.1 INTRAMURAL LEIOMYOMA OF UTERUS: ICD-10-CM

## 2020-12-23 PROCEDURE — 3075F SYST BP GE 130 - 139MM HG: CPT | Performed by: FAMILY MEDICINE

## 2020-12-23 PROCEDURE — 3079F DIAST BP 80-89 MM HG: CPT | Performed by: FAMILY MEDICINE

## 2020-12-23 PROCEDURE — 3008F BODY MASS INDEX DOCD: CPT | Performed by: FAMILY MEDICINE

## 2020-12-23 PROCEDURE — 99396 PREV VISIT EST AGE 40-64: CPT | Performed by: FAMILY MEDICINE

## 2020-12-23 NOTE — PROGRESS NOTES
HPI:   Guille Landon is a 50year old female who presents for a complete physical exam. Symptoms: denies discharge, itching, burning or dysuria, periods are regular. Fibroids bleeding is much better 5-9 days flow is down.   On 10/3/2019 patient had an Dextrin (BENEFIBER DRINK MIX OR) Take 1 Dose by mouth 2 (two) times daily as needed. • Probiotic Product (PROBIOTIC OR) Take 1 capsule by mouth daily.           Past Medical History:   Diagnosis Date   • Acute nonsuppurative otitis media, unspecified periods better not as heavy  MUSCULOSKELETAL: denies joint pain or stiffness  NEURO: denies headaches, tingling or dizziness  PSYCHE: denies depression or anxiety  HEMATOLOGIC: denies bleeding abnormalities; history of iron deficiency  ENDOCRINE: denies te TSH and Free T4 [E]      Iron And Tibc [E]      Ferritin [E]      Meds & Refills for this Visit:  Requested Prescriptions      No prescriptions requested or ordered in this encounter       Imaging & Consults:  None  1.  Well female exam with routine gynecol

## 2020-12-23 NOTE — PATIENT INSTRUCTIONS
Routine Healthcare for Women   Routine checkups can find treatable problems early. For many medical problems, early treatment can help prevent more serious complications. The value of checkups and how often you have them depend mainly on your age.  Your per history of high cholesterol. Colorectal cancer test: if you are 48 or older.  Recommended tests include a yearly test for blood in the stool, called the fecal occult blood test (FOBT) or fecal immunochemical test (FIT), and one of the following tests:   s history. Many other tests are often done at routine checkups, but there is no current evidence that they are helpful as routine screening tests for healthy women. Examples of such tests are a CBC (complete blood count), thyroid tests, and urine tests.  Wh medical condition, such as diabetes. Varicella (chickenpox) if you have never had chickenpox. Zoster (shingles) vaccine: if you are 50 or older. The vaccine can help prevent shingles. It can also reduce the pain caused by shingles.    What other things has 755 mg capsules I take between 2-3 per day.

## 2020-12-24 PROBLEM — R19.5 LOOSE STOOLS: Status: RESOLVED | Noted: 2019-02-25 | Resolved: 2020-12-24

## 2021-01-25 ENCOUNTER — LAB ENCOUNTER (OUTPATIENT)
Dept: LAB | Age: 46
End: 2021-01-25
Attending: FAMILY MEDICINE
Payer: COMMERCIAL

## 2021-01-25 DIAGNOSIS — Z00.00 LABORATORY EXAMINATION ORDERED AS PART OF A ROUTINE GENERAL MEDICAL EXAMINATION: ICD-10-CM

## 2021-01-25 DIAGNOSIS — Z86.39 HISTORY OF IRON DEFICIENCY: ICD-10-CM

## 2021-01-25 LAB
ALBUMIN SERPL-MCNC: 3.8 G/DL (ref 3.4–5)
ALBUMIN/GLOB SERPL: 1 {RATIO} (ref 1–2)
ALP LIVER SERPL-CCNC: 66 U/L
ALT SERPL-CCNC: 23 U/L
ANION GAP SERPL CALC-SCNC: 5 MMOL/L (ref 0–18)
AST SERPL-CCNC: 14 U/L (ref 15–37)
BASOPHILS # BLD AUTO: 0.04 X10(3) UL (ref 0–0.2)
BASOPHILS NFR BLD AUTO: 0.6 %
BILIRUB SERPL-MCNC: 0.2 MG/DL (ref 0.1–2)
BUN BLD-MCNC: 11 MG/DL (ref 7–18)
BUN/CREAT SERPL: 14.7 (ref 10–20)
CALCIUM BLD-MCNC: 8.8 MG/DL (ref 8.5–10.1)
CHLORIDE SERPL-SCNC: 109 MMOL/L (ref 98–112)
CHOLEST SMN-MCNC: 168 MG/DL (ref ?–200)
CO2 SERPL-SCNC: 24 MMOL/L (ref 21–32)
CREAT BLD-MCNC: 0.75 MG/DL
DEPRECATED HBV CORE AB SER IA-ACNC: 6.1 NG/ML
DEPRECATED RDW RBC AUTO: 47.7 FL (ref 35.1–46.3)
EOSINOPHIL # BLD AUTO: 0.11 X10(3) UL (ref 0–0.7)
EOSINOPHIL NFR BLD AUTO: 1.7 %
ERYTHROCYTE [DISTWIDTH] IN BLOOD BY AUTOMATED COUNT: 16.4 % (ref 11–15)
GLOBULIN PLAS-MCNC: 3.8 G/DL (ref 2.8–4.4)
GLUCOSE BLD-MCNC: 99 MG/DL (ref 70–99)
HCT VFR BLD AUTO: 36.9 %
HDLC SERPL-MCNC: 46 MG/DL (ref 40–59)
HGB BLD-MCNC: 11.6 G/DL
IMM GRANULOCYTES # BLD AUTO: 0.01 X10(3) UL (ref 0–1)
IMM GRANULOCYTES NFR BLD: 0.2 %
IRON SATURATION: 6 %
IRON SERPL-MCNC: 26 UG/DL
LDLC SERPL CALC-MCNC: 103 MG/DL (ref ?–100)
LYMPHOCYTES # BLD AUTO: 1.81 X10(3) UL (ref 1–4)
LYMPHOCYTES NFR BLD AUTO: 28.1 %
M PROTEIN MFR SERPL ELPH: 7.6 G/DL (ref 6.4–8.2)
MCH RBC QN AUTO: 25.1 PG (ref 26–34)
MCHC RBC AUTO-ENTMCNC: 31.4 G/DL (ref 31–37)
MCV RBC AUTO: 79.7 FL
MONOCYTES # BLD AUTO: 0.51 X10(3) UL (ref 0.1–1)
MONOCYTES NFR BLD AUTO: 7.9 %
NEUTROPHILS # BLD AUTO: 3.96 X10 (3) UL (ref 1.5–7.7)
NEUTROPHILS # BLD AUTO: 3.96 X10(3) UL (ref 1.5–7.7)
NEUTROPHILS NFR BLD AUTO: 61.5 %
NONHDLC SERPL-MCNC: 122 MG/DL (ref ?–130)
OSMOLALITY SERPL CALC.SUM OF ELEC: 285 MOSM/KG (ref 275–295)
PATIENT FASTING Y/N/NP: YES
PATIENT FASTING Y/N/NP: YES
PLATELET # BLD AUTO: 284 10(3)UL (ref 150–450)
POTASSIUM SERPL-SCNC: 4.2 MMOL/L (ref 3.5–5.1)
RBC # BLD AUTO: 4.63 X10(6)UL
SODIUM SERPL-SCNC: 138 MMOL/L (ref 136–145)
T4 FREE SERPL-MCNC: 0.9 NG/DL (ref 0.8–1.7)
TOTAL IRON BINDING CAPACITY: 471 UG/DL (ref 240–450)
TRANSFERRIN SERPL-MCNC: 316 MG/DL (ref 200–360)
TRIGL SERPL-MCNC: 97 MG/DL (ref 30–149)
TSI SER-ACNC: 0.72 MIU/ML (ref 0.36–3.74)
VLDLC SERPL CALC-MCNC: 19 MG/DL (ref 0–30)
WBC # BLD AUTO: 6.4 X10(3) UL (ref 4–11)

## 2021-01-25 PROCEDURE — 84439 ASSAY OF FREE THYROXINE: CPT

## 2021-01-25 PROCEDURE — 85025 COMPLETE CBC W/AUTO DIFF WBC: CPT

## 2021-01-25 PROCEDURE — 80053 COMPREHEN METABOLIC PANEL: CPT

## 2021-01-25 PROCEDURE — 80061 LIPID PANEL: CPT

## 2021-01-25 PROCEDURE — 82728 ASSAY OF FERRITIN: CPT

## 2021-01-25 PROCEDURE — 84443 ASSAY THYROID STIM HORMONE: CPT

## 2021-01-25 PROCEDURE — 36415 COLL VENOUS BLD VENIPUNCTURE: CPT

## 2021-01-25 PROCEDURE — 83550 IRON BINDING TEST: CPT

## 2021-01-25 PROCEDURE — 83540 ASSAY OF IRON: CPT

## 2021-01-26 DIAGNOSIS — N92.0 MENORRHAGIA WITH REGULAR CYCLE: ICD-10-CM

## 2021-01-26 DIAGNOSIS — R79.0 LOW IRON STORES: Primary | ICD-10-CM

## 2021-01-26 NOTE — PROGRESS NOTES
Low iron storage (ferritin) start ferrous sulfate @ 325 mg (65 mg of elemental iron) orally once  a day. Take with vitamin C 500 mg to help absorption. .  Repeat ferritin in 3 months with CBC  Refer to GYN for heavy menstrual bleeding  Thyroid testing was

## 2021-02-01 ENCOUNTER — PATIENT MESSAGE (OUTPATIENT)
Dept: FAMILY MEDICINE CLINIC | Facility: CLINIC | Age: 46
End: 2021-02-01

## 2021-02-01 NOTE — TELEPHONE ENCOUNTER
From: Bindu Bernardo  To: Ana Maria Altman PA-C  Sent: 2/1/2021 8:41 AM CST  Subject: Non-Urgent Medical Question    I just wanted to let you know that since my visit I have started taking Biotin vitamins. 10,000 Mcg. Is it ok to keep taking these?  I w

## 2021-04-07 ENCOUNTER — OFFICE VISIT (OUTPATIENT)
Dept: OBGYN CLINIC | Facility: CLINIC | Age: 46
End: 2021-04-07

## 2021-04-07 VITALS — BODY MASS INDEX: 29 KG/M2 | WEIGHT: 177.19 LBS | SYSTOLIC BLOOD PRESSURE: 148 MMHG | DIASTOLIC BLOOD PRESSURE: 90 MMHG

## 2021-04-07 DIAGNOSIS — D50.0 IRON DEFICIENCY ANEMIA SECONDARY TO BLOOD LOSS (CHRONIC): ICD-10-CM

## 2021-04-07 DIAGNOSIS — Z86.018 HISTORY OF UTERINE FIBROID: ICD-10-CM

## 2021-04-07 DIAGNOSIS — N92.0 MENORRHAGIA WITH REGULAR CYCLE: Primary | ICD-10-CM

## 2021-04-07 DIAGNOSIS — Z12.4 CERVICAL CANCER SCREENING: ICD-10-CM

## 2021-04-07 PROCEDURE — 99213 OFFICE O/P EST LOW 20 MIN: CPT | Performed by: NURSE PRACTITIONER

## 2021-04-07 PROCEDURE — 3080F DIAST BP >= 90 MM HG: CPT | Performed by: NURSE PRACTITIONER

## 2021-04-07 PROCEDURE — 88175 CYTOPATH C/V AUTO FLUID REDO: CPT | Performed by: NURSE PRACTITIONER

## 2021-04-07 PROCEDURE — 87624 HPV HI-RISK TYP POOLED RSLT: CPT | Performed by: NURSE PRACTITIONER

## 2021-04-07 PROCEDURE — 3077F SYST BP >= 140 MM HG: CPT | Performed by: NURSE PRACTITIONER

## 2021-04-07 NOTE — PROGRESS NOTES
Gyne note     S: patient is a 50year old yo [id number] here for a recent increase in menstrual bleeding. She has a long history of heavy menses and uterine fibroids.  She had a uterine artery embolization done in 2019 and no follow up MRI was done to evaluate

## 2021-04-07 NOTE — PATIENT INSTRUCTIONS
Evening Primrose, Black Cohash, Chasteberry (Vitex)- menopausal symtpoms      Humm Swathi Melatonin- or try an extended release Melatonin

## 2021-04-27 ENCOUNTER — HOSPITAL ENCOUNTER (OUTPATIENT)
Dept: ULTRASOUND IMAGING | Age: 46
Discharge: HOME | End: 2021-04-27
Attending: NURSE PRACTITIONER
Payer: COMMERCIAL

## 2021-04-27 DIAGNOSIS — Z86.018 HISTORY OF UTERINE FIBROID: ICD-10-CM

## 2021-04-27 DIAGNOSIS — N92.0 MENORRHAGIA WITH REGULAR CYCLE: ICD-10-CM

## 2021-04-27 PROCEDURE — 76856 US EXAM PELVIC COMPLETE: CPT | Performed by: NURSE PRACTITIONER

## 2021-04-27 PROCEDURE — 76830 TRANSVAGINAL US NON-OB: CPT | Performed by: NURSE PRACTITIONER

## 2021-04-28 NOTE — PROGRESS NOTES
Patient notified of results and recommendations. Pt would like to know why she needs to see MD for treatment if her fibroids are decreased in size. Pt also asking why an EMB is needed. Discussed with pt this is likely due to her irregular menses.   Pt st

## 2021-04-29 NOTE — PROGRESS NOTES
Patient returned call. Relayed recommendations as noted by Shaun Sheridan in her note from 4/28/21. Questions answered and patient states understanding. She states that she desires to observe at this time and does not desires appt.  She will call with any concerns or

## 2021-07-22 ENCOUNTER — TELEPHONE (OUTPATIENT)
Dept: FAMILY MEDICINE CLINIC | Facility: CLINIC | Age: 46
End: 2021-07-22

## 2021-07-22 NOTE — TELEPHONE ENCOUNTER
Landon Pace is calling to see what she can do, she thinks she may have a sinus infection, since Tuesday, Please call Landon Pace at [phone]

## 2021-07-22 NOTE — TELEPHONE ENCOUNTER
Spoke to patient. States for 2 days has had temp 100.5. Sinus congestion and cough. Is taking musinex and tylenol. Thinks she has a sinus infection. Requesting appointment.   Stated she will find pharmacy to do rapid COVID test.  Did make appointment with

## 2021-07-22 NOTE — TELEPHONE ENCOUNTER
If patient has had symptoms only for 2 days, she does not have a sinus infection. She should continue symptomatic treatment and wait for results of COVID test before being seen.  She can use Flonase 2 sprays each nostril once daily after shower to help with

## 2021-07-23 LAB — AMB EXT COVID-19 RESULT: DETECTED

## 2021-07-26 ENCOUNTER — TELEPHONE (OUTPATIENT)
Dept: FAMILY MEDICINE CLINIC | Facility: CLINIC | Age: 46
End: 2021-07-26

## 2021-07-26 NOTE — TELEPHONE ENCOUNTER
Anshu Thompson is calling because she was tested positive for covid she is having fever, cough and body aches, she is just wondering what she can take for the cough that is what she is struggling with now, please call Anshu Thompson at [phone]

## 2021-07-26 NOTE — TELEPHONE ENCOUNTER
Advised on musinex, tylenol. Advised to proceed to ER for eval and treatment if difficulty breathing. Patient VU and is in agreement.

## 2021-07-27 ENCOUNTER — TELEPHONE (OUTPATIENT)
Dept: FAMILY MEDICINE CLINIC | Facility: CLINIC | Age: 46
End: 2021-07-27

## 2021-07-27 NOTE — TELEPHONE ENCOUNTER
Patient says diarrhea started two days ago. Has had 3 loose stools today. Instructed patient on BRAT diet. Advised Pedialyte to drink. She denies nausea or emesis. Please advise further if needed.

## 2021-07-27 NOTE — TELEPHONE ENCOUNTER
Pt states she was DX with COVID on 7/23 through a [facility] and feels horrible, wants to know what she can do about the diarrhea.

## 2021-07-27 NOTE — TELEPHONE ENCOUNTER
Get update on Thursday and how she is doing.   Make sure there are no fevers and that her breathing is normal.

## 2021-07-28 ENCOUNTER — HOSPITAL ENCOUNTER (OUTPATIENT)
Age: 46
Discharge: HOME | End: 2021-07-28
Attending: EMERGENCY MEDICINE
Payer: COMMERCIAL

## 2021-07-28 ENCOUNTER — APPOINTMENT (OUTPATIENT)
Dept: GENERAL RADIOLOGY | Age: 46
End: 2021-07-28
Attending: EMERGENCY MEDICINE
Payer: COMMERCIAL

## 2021-07-28 VITALS
BODY MASS INDEX: 28.48 KG/M2 | WEIGHT: 173 LBS | HEART RATE: 87 BPM | SYSTOLIC BLOOD PRESSURE: 139 MMHG | RESPIRATION RATE: 16 BRPM | OXYGEN SATURATION: 96 % | TEMPERATURE: 100 F | DIASTOLIC BLOOD PRESSURE: 97 MMHG | HEIGHT: 65.5 IN

## 2021-07-28 DIAGNOSIS — U07.1 PNEUMONIA DUE TO COVID-19 VIRUS: Primary | ICD-10-CM

## 2021-07-28 DIAGNOSIS — J12.82 PNEUMONIA DUE TO COVID-19 VIRUS: Primary | ICD-10-CM

## 2021-07-28 PROCEDURE — 99213 OFFICE O/P EST LOW 20 MIN: CPT

## 2021-07-28 PROCEDURE — 99203 OFFICE O/P NEW LOW 30 MIN: CPT

## 2021-07-28 PROCEDURE — 71046 X-RAY EXAM CHEST 2 VIEWS: CPT | Performed by: EMERGENCY MEDICINE

## 2021-07-28 RX ORDER — IBUPROFEN 600 MG/1
600 TABLET ORAL ONCE
Status: COMPLETED | OUTPATIENT
Start: 2021-07-28 | End: 2021-07-28

## 2021-07-28 RX ORDER — ALBUTEROL SULFATE 90 UG/1
2 AEROSOL, METERED RESPIRATORY (INHALATION) EVERY 4 HOURS PRN
Qty: 1 EACH | Refills: 0 | Status: SHIPPED | OUTPATIENT
Start: 2021-07-28 | End: 2021-08-27

## 2021-07-28 RX ORDER — BENZONATATE 100 MG/1
100 CAPSULE ORAL 3 TIMES DAILY PRN
Qty: 30 CAPSULE | Refills: 0 | Status: SHIPPED | OUTPATIENT
Start: 2021-07-28 | End: 2021-08-27

## 2021-07-28 NOTE — ED PROVIDER NOTES
Patient Seen in: [facility]      History   Patient presents with:  Cough  Covid    Stated Complaint: cough; covid + 07/28    HPI/Subjective:   HPI    Today is day 9 of the patient's Covid symptoms. She developed symptoms on the 20th. 28.35 kg/m²         Physical Exam      Constitutional: Awake, alert, age appearing, non-toxic  Head: Normocephalic and atraumatic. Eyes: EOM are normal. Pupils are equal, round, and reactive to light. Neck: Normal range of motion. Neck supple.  No JVD 6:21 PM              MDM      Day 9 of Covid symptoms. Covid pneumonia on the chest x-ray. Maintaining her sats easily. She is otherwise healthy and hopefully should recover. Not a candidate for monoclonal antibody unfortunately.   Continue symptomatic

## 2021-07-28 NOTE — ED INITIAL ASSESSMENT (HPI)
C/o cough for 9 days coughing-up whitish mucous. Tested positive for Covid 7/23/2021. Worsening cough and fatigue. Short of breath with activity. With fever.

## 2021-07-28 NOTE — TELEPHONE ENCOUNTER
Patient c/o worsening cough, feels like it has gone down into chest/lungs. Patient had audible SOB while talking and c/o worsening fatigue. She states SOB is mostly with activity only. Patient advised to proceed to ED for further evaluation of worsening symptoms.  She VU and agreed with plan

## 2021-07-28 NOTE — TELEPHONE ENCOUNTER
Pt states she was told her cough is normal with COVID but she is not sure the cough she has and what she feels is normal and would like to speak to a nurse.

## 2021-07-30 ENCOUNTER — TELEPHONE (OUTPATIENT)
Dept: FAMILY MEDICINE CLINIC | Facility: CLINIC | Age: 46
End: 2021-07-30

## 2021-07-30 NOTE — TELEPHONE ENCOUNTER
Patient c/o having 2 loose stools daily. Is following BRAT diet. T. 99. Cough improving. She purchased imodium but wants to make sure its ok to take. Please advise.

## 2021-07-30 NOTE — TELEPHONE ENCOUNTER
Probiotic start and only if needed imodium. She has had no antibiotic for past year. COVID pneumonia feeling better with breathing just tired. Is doing the lung exercises regularly.

## 2021-07-30 NOTE — TELEPHONE ENCOUNTER
Esperanza Verdin has a question regarding her diarrhea    she was tested positve for Covid last week, Please call Esperanza Verdin at [phone]

## 2021-08-02 NOTE — TELEPHONE ENCOUNTER
Phoned patient. States she feels better. Started probiotics and diarrhea has improved. Continues to have fatigue. Overall, states she feels better.

## 2021-09-28 ENCOUNTER — TELEPHONE (OUTPATIENT)
Dept: FAMILY MEDICINE CLINIC | Facility: CLINIC | Age: 46
End: 2021-09-28

## 2021-09-28 DIAGNOSIS — K08.9 EXTRACTION OF TOOTH NEEDED: Primary | ICD-10-CM

## 2021-09-28 NOTE — TELEPHONE ENCOUNTER
Dentist Gabriela  [address] [facility], [address] [place] [address]  Phone [phone]      Advised patient to call dentist office and have them fax us the clinical notes so we can process the referral.  She stated she would.

## 2021-09-28 NOTE — TELEPHONE ENCOUNTER
Pt called and said she will be having her wisdom teeth removed and she will need a referral to see Dr. Osei Cotton DMD, MD for a consult and also the surgery. She said nothing was mentioned of having a pre-op appt.

## 2021-10-01 NOTE — TELEPHONE ENCOUNTER
Clinical notes received from dentist office. Sent to scan for urgent scan. Will fax to [facility] once signed.

## 2021-10-07 NOTE — TELEPHONE ENCOUNTER
Left detailed VM for patient notifying of approval.     Faxed referral auth to dental office @ [phone]

## 2022-01-04 ENCOUNTER — TELEPHONE (OUTPATIENT)
Dept: FAMILY MEDICINE CLINIC | Facility: CLINIC | Age: 47
End: 2022-01-04

## 2022-01-04 DIAGNOSIS — K08.9 EXTRACTION OF TOOTH NEEDED: Primary | ICD-10-CM

## 2022-01-04 NOTE — TELEPHONE ENCOUNTER
Patient states the OOP amount with Dr. Sara Ann was going to be high. She would like to try another referral to a different provider. Referral placed. Will wait for approval then fax.

## 2022-01-04 NOTE — TELEPHONE ENCOUNTER
Pt is requesting a referral to an oral surgeon - Dr. Hussain Mchugh with [facility] oral surgeon and Fax # [phone]. Pt has an appt scheduled on 1/13/2022. Pt stataes she has to get three wisdom teeth pulled - tooth #1, tooth #16 and tooth #32. 23:00

## 2022-02-09 ENCOUNTER — OFFICE VISIT (OUTPATIENT)
Dept: FAMILY MEDICINE CLINIC | Facility: CLINIC | Age: 47
End: 2022-02-09
Payer: COMMERCIAL

## 2022-02-09 VITALS
BODY MASS INDEX: 28.93 KG/M2 | DIASTOLIC BLOOD PRESSURE: 98 MMHG | HEART RATE: 88 BPM | SYSTOLIC BLOOD PRESSURE: 150 MMHG | WEIGHT: 180 LBS | HEIGHT: 65.95 IN | TEMPERATURE: 98 F

## 2022-02-09 DIAGNOSIS — Z86.39 HISTORY OF IRON DEFICIENCY: ICD-10-CM

## 2022-02-09 DIAGNOSIS — Z13.228 SCREENING FOR ENDOCRINE, NUTRITIONAL, METABOLIC AND IMMUNITY DISORDER: ICD-10-CM

## 2022-02-09 DIAGNOSIS — Z13.6 ENCOUNTER FOR LIPID SCREENING FOR CARDIOVASCULAR DISEASE: ICD-10-CM

## 2022-02-09 DIAGNOSIS — Z13.220 ENCOUNTER FOR LIPID SCREENING FOR CARDIOVASCULAR DISEASE: ICD-10-CM

## 2022-02-09 DIAGNOSIS — Z13.29 SCREENING FOR ENDOCRINE, NUTRITIONAL, METABOLIC AND IMMUNITY DISORDER: ICD-10-CM

## 2022-02-09 DIAGNOSIS — Z12.31 ENCOUNTER FOR SCREENING MAMMOGRAM FOR MALIGNANT NEOPLASM OF BREAST: ICD-10-CM

## 2022-02-09 DIAGNOSIS — N91.2 AMENORRHEA: ICD-10-CM

## 2022-02-09 DIAGNOSIS — Z13.21 SCREENING FOR ENDOCRINE, NUTRITIONAL, METABOLIC AND IMMUNITY DISORDER: ICD-10-CM

## 2022-02-09 DIAGNOSIS — Z00.00 WELL FEMALE EXAM WITHOUT GYNECOLOGICAL EXAM: Primary | ICD-10-CM

## 2022-02-09 DIAGNOSIS — Z13.0 SCREENING FOR ENDOCRINE, NUTRITIONAL, METABOLIC AND IMMUNITY DISORDER: ICD-10-CM

## 2022-02-09 PROCEDURE — 3008F BODY MASS INDEX DOCD: CPT | Performed by: FAMILY MEDICINE

## 2022-02-09 PROCEDURE — 3080F DIAST BP >= 90 MM HG: CPT | Performed by: FAMILY MEDICINE

## 2022-02-09 PROCEDURE — 3077F SYST BP >= 140 MM HG: CPT | Performed by: FAMILY MEDICINE

## 2022-02-09 PROCEDURE — 99396 PREV VISIT EST AGE 40-64: CPT | Performed by: FAMILY MEDICINE

## 2022-02-09 RX ORDER — MELATONIN
1 DAILY
COMMUNITY
Start: 2022-02-01

## 2022-03-02 ENCOUNTER — TELEPHONE (OUTPATIENT)
Dept: FAMILY MEDICINE CLINIC | Facility: CLINIC | Age: 47
End: 2022-03-02

## 2022-03-02 NOTE — TELEPHONE ENCOUNTER
Dr. Jared Quintanilla office is requesting a referral for wisdom teeth extraction under general anesthesia (CPT code: [phone]) for bony impacted teeth (ICD K01.1) at teeth #'s: 1, 16, & 32.

## 2022-03-03 NOTE — TELEPHONE ENCOUNTER
Spoke to patient. States she needs referral placed for extraction for her wisdom teeth. She had consult and now they want to extract.   Referral placed with proper CPT codes

## 2022-03-14 ENCOUNTER — TELEPHONE (OUTPATIENT)
Dept: FAMILY MEDICINE CLINIC | Facility: CLINIC | Age: 47
End: 2022-03-14

## 2022-03-14 NOTE — TELEPHONE ENCOUNTER
Patient calling on status of referral for   Eugene Buddy  That is pending    Please call patient with status

## 2022-03-17 NOTE — TELEPHONE ENCOUNTER
Patient calling states dentist did not receive referral from yesterday please re fax    [phone] Attn: Ken Duncan

## 2022-08-22 ENCOUNTER — TELEPHONE (OUTPATIENT)
Dept: FAMILY MEDICINE CLINIC | Facility: CLINIC | Age: 47
End: 2022-08-22

## 2022-08-22 NOTE — TELEPHONE ENCOUNTER
Patient called requesting appt for sore throat that started yesterday. States she has no other sxs at all.

## 2022-08-22 NOTE — TELEPHONE ENCOUNTER
Patient c/o sore throat beginning yesterday. Denies runny nose. COVID test completed and was negative. Advised to take tylenol for symptoms, throat lozenzes, cool liquids. Advised to proceed to [facility] or call us if symptoms persist. She KAYY.

## 2022-08-25 ENCOUNTER — OFFICE VISIT (OUTPATIENT)
Dept: FAMILY MEDICINE CLINIC | Facility: CLINIC | Age: 47
End: 2022-08-25
Payer: COMMERCIAL

## 2022-08-25 VITALS
SYSTOLIC BLOOD PRESSURE: 170 MMHG | HEART RATE: 77 BPM | OXYGEN SATURATION: 99 % | RESPIRATION RATE: 18 BRPM | DIASTOLIC BLOOD PRESSURE: 100 MMHG | TEMPERATURE: 98 F

## 2022-08-25 DIAGNOSIS — R03.0 ELEVATED BLOOD PRESSURE READING: ICD-10-CM

## 2022-08-25 DIAGNOSIS — J02.9 SORE THROAT: Primary | ICD-10-CM

## 2022-08-25 LAB
CONTROL LINE PRESENT WITH A CLEAR BACKGROUND (YES/NO): YES YES/NO
KIT LOT #: 2554 NUMERIC
STREP GRP A CUL-SCR: NEGATIVE

## 2022-08-25 PROCEDURE — 3080F DIAST BP >= 90 MM HG: CPT | Performed by: NURSE PRACTITIONER

## 2022-08-25 PROCEDURE — 99213 OFFICE O/P EST LOW 20 MIN: CPT | Performed by: NURSE PRACTITIONER

## 2022-08-25 PROCEDURE — 87880 STREP A ASSAY W/OPTIC: CPT | Performed by: NURSE PRACTITIONER

## 2022-08-25 PROCEDURE — 3077F SYST BP >= 140 MM HG: CPT | Performed by: NURSE PRACTITIONER

## 2022-08-26 LAB — SARS-COV-2 RNA RESP QL NAA+PROBE: NOT DETECTED

## 2022-09-07 ENCOUNTER — TELEPHONE (OUTPATIENT)
Dept: FAMILY MEDICINE CLINIC | Facility: CLINIC | Age: 47
End: 2022-09-07

## 2022-09-07 NOTE — TELEPHONE ENCOUNTER
Patient is going to get blood work done and wants to know if she should fast and how long and does that include water

## 2022-09-09 ENCOUNTER — HOSPITAL ENCOUNTER (OUTPATIENT)
Dept: MAMMOGRAPHY | Age: 47
Discharge: HOME | End: 2022-09-09
Attending: FAMILY MEDICINE
Payer: COMMERCIAL

## 2022-09-09 ENCOUNTER — LAB ENCOUNTER (OUTPATIENT)
Dept: LAB | Age: 47
End: 2022-09-09
Attending: FAMILY MEDICINE
Payer: COMMERCIAL

## 2022-09-09 DIAGNOSIS — Z13.29 SCREENING FOR ENDOCRINE, NUTRITIONAL, METABOLIC AND IMMUNITY DISORDER: ICD-10-CM

## 2022-09-09 DIAGNOSIS — Z13.0 SCREENING FOR ENDOCRINE, NUTRITIONAL, METABOLIC AND IMMUNITY DISORDER: ICD-10-CM

## 2022-09-09 DIAGNOSIS — R73.9 HYPERGLYCEMIA: ICD-10-CM

## 2022-09-09 DIAGNOSIS — Z13.21 SCREENING FOR ENDOCRINE, NUTRITIONAL, METABOLIC AND IMMUNITY DISORDER: ICD-10-CM

## 2022-09-09 DIAGNOSIS — Z13.220 ENCOUNTER FOR LIPID SCREENING FOR CARDIOVASCULAR DISEASE: ICD-10-CM

## 2022-09-09 DIAGNOSIS — Z86.39 HISTORY OF IRON DEFICIENCY: ICD-10-CM

## 2022-09-09 DIAGNOSIS — Z12.31 ENCOUNTER FOR SCREENING MAMMOGRAM FOR MALIGNANT NEOPLASM OF BREAST: ICD-10-CM

## 2022-09-09 DIAGNOSIS — Z13.228 SCREENING FOR ENDOCRINE, NUTRITIONAL, METABOLIC AND IMMUNITY DISORDER: ICD-10-CM

## 2022-09-09 DIAGNOSIS — N91.2 AMENORRHEA: ICD-10-CM

## 2022-09-09 DIAGNOSIS — Z13.6 ENCOUNTER FOR LIPID SCREENING FOR CARDIOVASCULAR DISEASE: ICD-10-CM

## 2022-09-09 LAB
ALBUMIN SERPL-MCNC: 3.7 G/DL (ref 3.4–5)
ALBUMIN/GLOB SERPL: 0.8 {RATIO} (ref 1–2)
ALP LIVER SERPL-CCNC: 75 U/L
ALT SERPL-CCNC: 51 U/L
ANION GAP SERPL CALC-SCNC: 6 MMOL/L (ref 0–18)
AST SERPL-CCNC: 25 U/L (ref 15–37)
BASOPHILS # BLD AUTO: 0.05 X10(3) UL (ref 0–0.2)
BASOPHILS NFR BLD AUTO: 0.8 %
BILIRUB SERPL-MCNC: 0.3 MG/DL (ref 0.1–2)
BUN BLD-MCNC: 11 MG/DL (ref 7–18)
CALCIUM BLD-MCNC: 9.4 MG/DL (ref 8.5–10.1)
CHLORIDE SERPL-SCNC: 109 MMOL/L (ref 98–112)
CHOLEST SERPL-MCNC: 201 MG/DL (ref ?–200)
CO2 SERPL-SCNC: 23 MMOL/L (ref 21–32)
CREAT BLD-MCNC: 0.79 MG/DL
DEPRECATED HBV CORE AB SER IA-ACNC: 37.4 NG/ML
EOSINOPHIL # BLD AUTO: 0.13 X10(3) UL (ref 0–0.7)
EOSINOPHIL NFR BLD AUTO: 2.1 %
ERYTHROCYTE [DISTWIDTH] IN BLOOD BY AUTOMATED COUNT: 13.2 %
EST. AVERAGE GLUCOSE BLD GHB EST-MCNC: 148 MG/DL (ref 68–126)
ESTRADIOL SERPL-MCNC: 17.5 PG/ML
FASTING PATIENT LIPID ANSWER: YES
FASTING STATUS PATIENT QL REPORTED: YES
FSH SERPL-ACNC: 38.4 MIU/ML
GFR SERPLBLD BASED ON 1.73 SQ M-ARVRAT: 92 ML/MIN/1.73M2 (ref 60–?)
GLOBULIN PLAS-MCNC: 4.4 G/DL (ref 2.8–4.4)
GLUCOSE BLD-MCNC: 117 MG/DL (ref 70–99)
HBA1C MFR BLD: 6.8 % (ref ?–5.7)
HCT VFR BLD AUTO: 44.1 %
HDLC SERPL-MCNC: 39 MG/DL (ref 40–59)
HGB BLD-MCNC: 14.1 G/DL
IMM GRANULOCYTES # BLD AUTO: 0.01 X10(3) UL (ref 0–1)
IMM GRANULOCYTES NFR BLD: 0.2 %
IRON SATN MFR SERPL: 22 %
IRON SERPL-MCNC: 97 UG/DL
LDLC SERPL CALC-MCNC: 135 MG/DL (ref ?–100)
LH SERPL-ACNC: 27.3 MIU/ML
LYMPHOCYTES # BLD AUTO: 2.39 X10(3) UL (ref 1–4)
LYMPHOCYTES NFR BLD AUTO: 37.8 %
MCH RBC QN AUTO: 26.8 PG (ref 26–34)
MCHC RBC AUTO-ENTMCNC: 32 G/DL (ref 31–37)
MCV RBC AUTO: 83.7 FL
MONOCYTES # BLD AUTO: 0.39 X10(3) UL (ref 0.1–1)
MONOCYTES NFR BLD AUTO: 6.2 %
NEUTROPHILS # BLD AUTO: 3.36 X10 (3) UL (ref 1.5–7.7)
NEUTROPHILS # BLD AUTO: 3.36 X10(3) UL (ref 1.5–7.7)
NEUTROPHILS NFR BLD AUTO: 52.9 %
NONHDLC SERPL-MCNC: 162 MG/DL (ref ?–130)
OSMOLALITY SERPL CALC.SUM OF ELEC: 286 MOSM/KG (ref 275–295)
PLATELET # BLD AUTO: 285 10(3)UL (ref 150–450)
POTASSIUM SERPL-SCNC: 3.9 MMOL/L (ref 3.5–5.1)
PROT SERPL-MCNC: 8.1 G/DL (ref 6.4–8.2)
RBC # BLD AUTO: 5.27 X10(6)UL
SODIUM SERPL-SCNC: 138 MMOL/L (ref 136–145)
T4 FREE SERPL-MCNC: 0.9 NG/DL (ref 0.8–1.7)
TIBC SERPL-MCNC: 440 UG/DL (ref 240–450)
TRANSFERRIN SERPL-MCNC: 295 MG/DL (ref 200–360)
TRIGL SERPL-MCNC: 151 MG/DL (ref 30–149)
TSI SER-ACNC: 0.77 MIU/ML (ref 0.36–3.74)
VLDLC SERPL CALC-MCNC: 28 MG/DL (ref 0–30)
WBC # BLD AUTO: 6.3 X10(3) UL (ref 4–11)

## 2022-09-09 PROCEDURE — 85025 COMPLETE CBC W/AUTO DIFF WBC: CPT

## 2022-09-09 PROCEDURE — 80061 LIPID PANEL: CPT

## 2022-09-09 PROCEDURE — 84443 ASSAY THYROID STIM HORMONE: CPT

## 2022-09-09 PROCEDURE — 83002 ASSAY OF GONADOTROPIN (LH): CPT

## 2022-09-09 PROCEDURE — 83036 HEMOGLOBIN GLYCOSYLATED A1C: CPT

## 2022-09-09 PROCEDURE — 83540 ASSAY OF IRON: CPT

## 2022-09-09 PROCEDURE — 82728 ASSAY OF FERRITIN: CPT

## 2022-09-09 PROCEDURE — 77067 SCR MAMMO BI INCL CAD: CPT | Performed by: FAMILY MEDICINE

## 2022-09-09 PROCEDURE — 83550 IRON BINDING TEST: CPT

## 2022-09-09 PROCEDURE — 36415 COLL VENOUS BLD VENIPUNCTURE: CPT

## 2022-09-09 PROCEDURE — 84439 ASSAY OF FREE THYROXINE: CPT

## 2022-09-09 PROCEDURE — 80053 COMPREHEN METABOLIC PANEL: CPT

## 2022-09-09 PROCEDURE — 82670 ASSAY OF TOTAL ESTRADIOL: CPT

## 2022-09-09 PROCEDURE — 83001 ASSAY OF GONADOTROPIN (FSH): CPT

## 2022-09-09 PROCEDURE — 77063 BREAST TOMOSYNTHESIS BI: CPT | Performed by: FAMILY MEDICINE

## 2022-09-10 NOTE — PROGRESS NOTES
Follow-up to discuss hemoglobin A1c is at 6.8 indicating diabetes. Make appointment to discuss treatment.

## 2022-09-10 NOTE — PROGRESS NOTES
Lipids elevated follow-up to discuss  Elevated glucose adding hemoglobin A1c. Normal kidney function and  liver function  Thyroid testing is normal  Iron storage is normal  Iron level is normal.  White blood cell count and red blood cell count are normal  Low estrogen level looks like menopause.

## 2022-10-11 NOTE — PROGRESS NOTES
Nataliia London is a 37year old female. HPI:   #1 loose stools  Patient comes in for follow-up on loose stools has been going on for the past 2-3 weeks.   States that she had changed her diet drastically and is now eating more fruits and vegetables and 1/day  Sees gynecology for fibroids is considering Lupron or possible hysterectomy but worried. Weakness: occured once 02/18/2019 at the car show, she went down, but was still able to see and hear. She was on her menses and bleeding heavily.  Car sh less      Drinks per session: 1 or 2      Binge frequency: Never    Drug use: No       REVIEW OF SYSTEMS:   GENERAL HEALTH: feels well otherwise  SKIN: denies any unusual skin lesions or rashes  RESPIRATORY: denies shortness of breath with exertion  CARDIO to 2 with cycle  Consider Prozac 20 mg with period and 5 days before  - CBC WITH DIFFERENTIAL WITH PLATELET; Future  - FE+TIBC+KENIA; Future    3. Vasovagal near syncope  Regular eating protein    4.  Loose stools  Referral given for colonoscopy at office vis Doxycycline Pregnancy And Lactation Text: This medication is Pregnancy Category D and not consider safe during pregnancy. It is also excreted in breast milk but is considered safe for shorter treatment courses.

## 2022-10-21 ENCOUNTER — PATIENT MESSAGE (OUTPATIENT)
Dept: FAMILY MEDICINE CLINIC | Facility: CLINIC | Age: 47
End: 2022-10-21

## 2022-10-24 NOTE — TELEPHONE ENCOUNTER
From: Luis Bernardo  To: Tommas Galeazzi, PA-C  Sent: 10/21/2022 10:32 PM CDT  Subject: Email Mary Cullen I hope all is well. I left a message with nurse and I just wanted to do a follow-up email. Frank Opitz has to conduct an interview with a PA for a class assignment. She would like to interview you, of course. The interview can be conducted via phone,email or in person. The assignment is due Wednesday 10-26. Please let me know if you would be able to do the interview.      Thanks,    Isabella Rose

## 2022-11-16 ENCOUNTER — TELEPHONE (OUTPATIENT)
Dept: FAMILY MEDICINE CLINIC | Facility: CLINIC | Age: 47
End: 2022-11-16

## 2022-11-16 ENCOUNTER — OFFICE VISIT (OUTPATIENT)
Dept: FAMILY MEDICINE CLINIC | Facility: CLINIC | Age: 47
End: 2022-11-16
Payer: COMMERCIAL

## 2022-11-16 VITALS
DIASTOLIC BLOOD PRESSURE: 76 MMHG | HEIGHT: 65 IN | RESPIRATION RATE: 18 BRPM | OXYGEN SATURATION: 98 % | BODY MASS INDEX: 29.82 KG/M2 | HEART RATE: 77 BPM | SYSTOLIC BLOOD PRESSURE: 136 MMHG | WEIGHT: 179 LBS | TEMPERATURE: 97 F

## 2022-11-16 DIAGNOSIS — E11.9 CONTROLLED TYPE 2 DIABETES MELLITUS WITHOUT COMPLICATION, WITHOUT LONG-TERM CURRENT USE OF INSULIN (HCC): Primary | ICD-10-CM

## 2022-11-16 DIAGNOSIS — Z12.11 COLON CANCER SCREENING: ICD-10-CM

## 2022-11-16 DIAGNOSIS — E78.2 MIXED HYPERLIPIDEMIA: ICD-10-CM

## 2022-11-16 LAB
CARTRIDGE LOT#: 507 NUMERIC
CREAT UR-SCNC: 115 MG/DL
HEMOGLOBIN A1C: 6.3 % (ref 4.3–5.6)
MICROALBUMIN UR-MCNC: 10.8 MG/DL
MICROALBUMIN/CREAT 24H UR-RTO: 93.9 UG/MG (ref ?–30)

## 2022-11-16 PROCEDURE — 3078F DIAST BP <80 MM HG: CPT | Performed by: FAMILY MEDICINE

## 2022-11-16 PROCEDURE — 3060F POS MICROALBUMINURIA REV: CPT | Performed by: FAMILY MEDICINE

## 2022-11-16 PROCEDURE — 3061F NEG MICROALBUMINURIA REV: CPT | Performed by: FAMILY MEDICINE

## 2022-11-16 PROCEDURE — 83036 HEMOGLOBIN GLYCOSYLATED A1C: CPT | Performed by: FAMILY MEDICINE

## 2022-11-16 PROCEDURE — 3008F BODY MASS INDEX DOCD: CPT | Performed by: FAMILY MEDICINE

## 2022-11-16 PROCEDURE — 82043 UR ALBUMIN QUANTITATIVE: CPT | Performed by: FAMILY MEDICINE

## 2022-11-16 PROCEDURE — 3044F HG A1C LEVEL LT 7.0%: CPT | Performed by: FAMILY MEDICINE

## 2022-11-16 PROCEDURE — 3075F SYST BP GE 130 - 139MM HG: CPT | Performed by: FAMILY MEDICINE

## 2022-11-16 PROCEDURE — 99214 OFFICE O/P EST MOD 30 MIN: CPT | Performed by: FAMILY MEDICINE

## 2022-11-16 PROCEDURE — 82570 ASSAY OF URINE CREATININE: CPT | Performed by: FAMILY MEDICINE

## 2022-11-16 RX ORDER — ROSUVASTATIN CALCIUM 5 MG/1
5 TABLET, COATED ORAL
Qty: 36 TABLET | Refills: 1 | Status: SHIPPED | OUTPATIENT
Start: 2022-11-16 | End: 2023-02-14

## 2022-11-16 RX ORDER — PYRIDOXINE HCL (VITAMIN B6) 50 MG
100 TABLET ORAL DAILY
COMMUNITY
Start: 2022-10-01

## 2022-11-16 RX ORDER — VITAMIN B COMPLEX
1000 TABLET ORAL ONCE
COMMUNITY
Start: 2022-11-07

## 2022-11-16 RX ORDER — B-COMPLEX WITH VITAMIN C
100 TABLET ORAL DAILY
COMMUNITY
Start: 2022-11-01

## 2022-11-17 ENCOUNTER — TELEPHONE (OUTPATIENT)
Dept: FAMILY MEDICINE CLINIC | Facility: CLINIC | Age: 47
End: 2022-11-17

## 2022-11-17 DIAGNOSIS — E11.9 CONTROLLED TYPE 2 DIABETES MELLITUS WITHOUT COMPLICATION, WITHOUT LONG-TERM CURRENT USE OF INSULIN (HCC): Primary | ICD-10-CM

## 2022-11-17 NOTE — TELEPHONE ENCOUNTER
----- Message from Marc Celeste PA-C sent at 11/17/2022  9:39 AM CST -----  Elevated urine microalbumin creatinine ratio repeat in 3 months with hemoglobin A1c drink adequate water if not improving an ACE inhibitor which is a low-dose blood pressure medication will help to reduce the number. Continue with low-carb diet.     Place order for urine microalbumin creatinine ratio for 3 months

## 2022-11-17 NOTE — PROGRESS NOTES
Elevated urine microalbumin creatinine ratio repeat in 3 months with hemoglobin A1c drink adequate water if not improving an ACE inhibitor which is a low-dose blood pressure medication will help to reduce the number. Continue with low-carb diet.     Place order for urine microalbumin creatinine ratio for 3 months

## 2022-11-18 ENCOUNTER — TELEPHONE (OUTPATIENT)
Dept: ENDOCRINOLOGY CLINIC | Facility: CLINIC | Age: 47
End: 2022-11-18

## 2022-11-25 ENCOUNTER — TELEPHONE (OUTPATIENT)
Dept: ENDOCRINOLOGY CLINIC | Facility: CLINIC | Age: 47
End: 2022-11-25

## 2022-12-07 ENCOUNTER — NURSE ONLY (OUTPATIENT)
Dept: ENDOCRINOLOGY CLINIC | Facility: CLINIC | Age: 47
End: 2022-12-07
Payer: COMMERCIAL

## 2022-12-07 ENCOUNTER — TELEPHONE (OUTPATIENT)
Dept: FAMILY MEDICINE CLINIC | Facility: CLINIC | Age: 47
End: 2022-12-07

## 2022-12-07 ENCOUNTER — OFFICE VISIT (OUTPATIENT)
Dept: FAMILY MEDICINE CLINIC | Facility: CLINIC | Age: 47
End: 2022-12-07
Payer: COMMERCIAL

## 2022-12-07 VITALS
RESPIRATION RATE: 18 BRPM | HEIGHT: 65 IN | OXYGEN SATURATION: 98 % | WEIGHT: 178 LBS | TEMPERATURE: 99 F | SYSTOLIC BLOOD PRESSURE: 138 MMHG | DIASTOLIC BLOOD PRESSURE: 74 MMHG | BODY MASS INDEX: 29.66 KG/M2 | HEART RATE: 78 BPM

## 2022-12-07 VITALS — WEIGHT: 178 LBS | BODY MASS INDEX: 30 KG/M2

## 2022-12-07 DIAGNOSIS — E11.9 CONTROLLED TYPE 2 DIABETES MELLITUS WITHOUT COMPLICATION, WITHOUT LONG-TERM CURRENT USE OF INSULIN (HCC): ICD-10-CM

## 2022-12-07 DIAGNOSIS — R30.0 DYSURIA: Primary | ICD-10-CM

## 2022-12-07 DIAGNOSIS — N95.1 PERIMENOPAUSE: ICD-10-CM

## 2022-12-07 LAB
APPEARANCE: CLEAR
BILIRUBIN: NEGATIVE
GLUCOSE (URINE DIPSTICK): NEGATIVE MG/DL
KETONES (URINE DIPSTICK): NEGATIVE MG/DL
MULTISTIX LOT#: ABNORMAL NUMERIC
NITRITE, URINE: NEGATIVE
PH, URINE: 6 (ref 4.5–8)
PROTEIN (URINE DIPSTICK): NEGATIVE MG/DL
SPECIFIC GRAVITY: 1.01 (ref 1–1.03)
URINE-COLOR: YELLOW
UROBILINOGEN,SEMI-QN: 0.2 MG/DL (ref 0–1.9)

## 2022-12-07 PROCEDURE — 3075F SYST BP GE 130 - 139MM HG: CPT | Performed by: FAMILY MEDICINE

## 2022-12-07 PROCEDURE — 3078F DIAST BP <80 MM HG: CPT | Performed by: FAMILY MEDICINE

## 2022-12-07 PROCEDURE — 87086 URINE CULTURE/COLONY COUNT: CPT | Performed by: FAMILY MEDICINE

## 2022-12-07 PROCEDURE — 81003 URINALYSIS AUTO W/O SCOPE: CPT | Performed by: FAMILY MEDICINE

## 2022-12-07 PROCEDURE — 99213 OFFICE O/P EST LOW 20 MIN: CPT | Performed by: FAMILY MEDICINE

## 2022-12-07 PROCEDURE — 3008F BODY MASS INDEX DOCD: CPT | Performed by: FAMILY MEDICINE

## 2022-12-07 NOTE — TELEPHONE ENCOUNTER
Tp states she was prescribed cholesterol med a couple of weeks ago and when she went to pick it up it was not there and the pharmacy stated they never received RX. [facility] [employer]. Pt also states she thinks she has a UTI. States mild burning for two days. She states lower back does hurt a little bit.  Looking for advice

## 2022-12-07 NOTE — TELEPHONE ENCOUNTER
Per pharmacist, prescription received. Patient informed rosuvastatin will be ready for p/u at her convenience. Appt scheduled for UTI sxs.

## 2022-12-10 NOTE — PROGRESS NOTES
Urine culture is negative no growth symptoms you are having may be associated with menopausal changes try the supplement you were considering to see if it will help.

## 2022-12-13 ENCOUNTER — TELEPHONE (OUTPATIENT)
Dept: FAMILY MEDICINE CLINIC | Facility: CLINIC | Age: 47
End: 2022-12-13

## 2022-12-13 ENCOUNTER — TELEPHONE (OUTPATIENT)
Dept: ENDOCRINOLOGY CLINIC | Facility: CLINIC | Age: 47
End: 2022-12-13

## 2022-12-13 DIAGNOSIS — E11.9 TYPE 2 DIABETES MELLITUS WITHOUT COMPLICATION, WITHOUT LONG-TERM CURRENT USE OF INSULIN (HCC): Primary | ICD-10-CM

## 2022-12-13 NOTE — TELEPHONE ENCOUNTER
Pt. seen for diabetes education; she continues to refuse to do fingersticks for blood sugars. We discussed wearing a diagnostic Alejandro for 2 wks  & she was agreeable. I have pended the order for signature.  Thank you

## 2022-12-13 NOTE — TELEPHONE ENCOUNTER
Called to let patient know that [employer] had received her stool sample and it was to large and that collecting needed to be repeated and someone from that company would be contacting to send her a new sample box. We laughed about her achievement and she stated she wouldn't send so much next time. Also, I told her if she didn't care from  [facility] to let the office know so  I could contact them personal to get her a new box.

## 2022-12-14 ENCOUNTER — NURSE ONLY (OUTPATIENT)
Dept: ENDOCRINOLOGY CLINIC | Facility: CLINIC | Age: 47
End: 2022-12-14
Payer: COMMERCIAL

## 2022-12-14 DIAGNOSIS — E11.9 CONTROLLED TYPE 2 DIABETES MELLITUS WITHOUT COMPLICATION, WITHOUT LONG-TERM CURRENT USE OF INSULIN (HCC): Primary | ICD-10-CM

## 2022-12-14 PROCEDURE — 95250 CONT GLUC MNTR PHYS/QHP EQP: CPT | Performed by: DIETITIAN, REGISTERED

## 2022-12-15 NOTE — PROGRESS NOTES
Omar Angelucci [place]   1975 was seen for Diagnostic Continuous Glucose Sensor Initial Evaluation:    Date: 2022  Referring Provider: JEAN Long  Start time: 9am End time: 9:30am    Sensor Type: [employer] Lot: [id number]  SN# [id number]  Expiration: 23  Sensor placed: Left arm    Pt. agreed to keep food logs    Plan: Follow-up for download in 14 days      Written materials were provided for reference. Patient verbalized understanding of all areas covered and has no further questions at this time.     Darrius Nolan, RN, CDE

## 2022-12-27 ENCOUNTER — TELEPHONE (OUTPATIENT)
Dept: ENDOCRINOLOGY CLINIC | Facility: CLINIC | Age: 47
End: 2022-12-27

## 2022-12-27 DIAGNOSIS — E11.9 CONTROLLED TYPE 2 DIABETES MELLITUS WITHOUT COMPLICATION, WITHOUT LONG-TERM CURRENT USE OF INSULIN (HCC): Primary | ICD-10-CM

## 2022-12-27 RX ORDER — BLOOD-GLUCOSE METER
1 EACH MISCELLANEOUS 2 TIMES DAILY
Qty: 60 EACH | Refills: 3 | Status: SHIPPED | OUTPATIENT
Start: 2022-12-27

## 2022-12-27 RX ORDER — BLOOD-GLUCOSE METER
1 EACH MISCELLANEOUS AS DIRECTED
Qty: 1 EACH | Refills: 0 | Status: SHIPPED | OUTPATIENT
Start: 2022-12-27

## 2022-12-27 NOTE — TELEPHONE ENCOUNTER
Pt. Is interested in the new meter & combination test strips & lancets. Prescription sent to pharmacy & pt. provided a discount card to use at the pharmacy.

## 2022-12-28 ENCOUNTER — NURSE ONLY (OUTPATIENT)
Dept: ENDOCRINOLOGY CLINIC | Facility: CLINIC | Age: 47
End: 2022-12-28
Payer: COMMERCIAL

## 2022-12-28 DIAGNOSIS — E11.9 CONTROLLED TYPE 2 DIABETES MELLITUS WITHOUT COMPLICATION, WITHOUT LONG-TERM CURRENT USE OF INSULIN (HCC): Primary | ICD-10-CM

## 2022-12-28 PROCEDURE — G0108 DIAB MANAGE TRN  PER INDIV: HCPCS | Performed by: DIETITIAN, REGISTERED

## 2022-12-28 NOTE — PROGRESS NOTES
Molina Bernardo  [id number] was seen for Continuous Glucose Sensor Follow-up Visit:    Date: 12/28/2022  Referring Provider: Faina Tong PA-C  Start time: 10am End time: 10:30am    Sensor Type: Abraham Formica 2    Site Assessment: sensor intact    Reviewed CGMS download and patient logs:  CGM Analysis of data: dates  12/14/22-12/28/22  Sensor download: full report in media  Average glucose : 106 mg/dl     GMI (Glucose Management Indicator):5.8%  Glucose variability: 14.1%    0% time above 180mg /dl   0% time above 250 mg/dl  100% time in target range:  mg/dl   0% time below target range: 70mg/dl     Food/activity diary findings:   Eats hard-boiled eggs & oatmeal for breakfast daily. Has large salads w/quinoa & brown rice & non-starchy veggies. Dinner consists of [employer] quinoa, smoked turkey or beans & rice  Exercising 30 min of walking or bike riding or stretching or wts    Current diabetes medications: None    Plan:  Continue current eating & exercise habits  Follow-up after next A1C in Feb 23  CGMS download- color copy sent to scan. Download forwarded to provider. Patient verbalized understanding and has no further questions at this time.     Austen Stovall RN,[facility]

## 2023-02-17 ENCOUNTER — TELEPHONE (OUTPATIENT)
Dept: FAMILY MEDICINE CLINIC | Facility: CLINIC | Age: 48
End: 2023-02-17

## 2024-03-05 ENCOUNTER — OFFICE VISIT (OUTPATIENT)
Dept: FAMILY MEDICINE CLINIC | Facility: CLINIC | Age: 49
End: 2024-03-05
Payer: COMMERCIAL

## 2024-03-05 VITALS
BODY MASS INDEX: 30.29 KG/M2 | HEART RATE: 86 BPM | HEIGHT: 65.5 IN | SYSTOLIC BLOOD PRESSURE: 146 MMHG | WEIGHT: 184 LBS | DIASTOLIC BLOOD PRESSURE: 86 MMHG | RESPIRATION RATE: 18 BRPM | TEMPERATURE: 97 F | OXYGEN SATURATION: 100 %

## 2024-03-05 DIAGNOSIS — Z13.6 ENCOUNTER FOR LIPID SCREENING FOR CARDIOVASCULAR DISEASE: ICD-10-CM

## 2024-03-05 DIAGNOSIS — Z13.0 SCREENING FOR ENDOCRINE, NUTRITIONAL, METABOLIC AND IMMUNITY DISORDER: ICD-10-CM

## 2024-03-05 DIAGNOSIS — Z79.899 MEDICATION MANAGEMENT: ICD-10-CM

## 2024-03-05 DIAGNOSIS — Z11.51 ENCOUNTER FOR SCREENING FOR HUMAN PAPILLOMAVIRUS (HPV): ICD-10-CM

## 2024-03-05 DIAGNOSIS — Z13.228 SCREENING FOR ENDOCRINE, NUTRITIONAL, METABOLIC AND IMMUNITY DISORDER: ICD-10-CM

## 2024-03-05 DIAGNOSIS — Z78.0 POST-MENOPAUSAL: ICD-10-CM

## 2024-03-05 DIAGNOSIS — N95.1 MENOPAUSAL SYMPTOMS: ICD-10-CM

## 2024-03-05 DIAGNOSIS — Z12.4 ENCOUNTER FOR SCREENING FOR CERVICAL CANCER: ICD-10-CM

## 2024-03-05 DIAGNOSIS — Z01.419 WELL FEMALE EXAM WITH ROUTINE GYNECOLOGICAL EXAM: Primary | ICD-10-CM

## 2024-03-05 DIAGNOSIS — E66.9 OBESITY (BMI 30.0-34.9): ICD-10-CM

## 2024-03-05 DIAGNOSIS — Z13.21 SCREENING FOR ENDOCRINE, NUTRITIONAL, METABOLIC AND IMMUNITY DISORDER: ICD-10-CM

## 2024-03-05 DIAGNOSIS — Z13.220 ENCOUNTER FOR LIPID SCREENING FOR CARDIOVASCULAR DISEASE: ICD-10-CM

## 2024-03-05 DIAGNOSIS — Z13.29 SCREENING FOR ENDOCRINE, NUTRITIONAL, METABOLIC AND IMMUNITY DISORDER: ICD-10-CM

## 2024-03-05 DIAGNOSIS — E11.65 UNCONTROLLED TYPE 2 DIABETES MELLITUS WITH HYPERGLYCEMIA (HCC): ICD-10-CM

## 2024-03-05 DIAGNOSIS — Z12.31 VISIT FOR SCREENING MAMMOGRAM: ICD-10-CM

## 2024-03-05 DIAGNOSIS — Z13.6 SCREENING FOR HEART DISEASE: ICD-10-CM

## 2024-03-05 PROBLEM — E66.811 OBESITY (BMI 30.0-34.9): Status: ACTIVE | Noted: 2024-03-05

## 2024-03-05 LAB
CARTRIDGE LOT#: 663 NUMERIC
CREAT UR-SCNC: 108 MG/DL
HEMOGLOBIN A1C: 7.3 % (ref 4.3–5.6)
MICROALBUMIN UR-MCNC: 6.55 MG/DL
MICROALBUMIN/CREAT 24H UR-RTO: 60.6 UG/MG (ref ?–30)

## 2024-03-05 PROCEDURE — 82043 UR ALBUMIN QUANTITATIVE: CPT | Performed by: FAMILY MEDICINE

## 2024-03-05 PROCEDURE — 82570 ASSAY OF URINE CREATININE: CPT | Performed by: FAMILY MEDICINE

## 2024-03-05 PROCEDURE — 87624 HPV HI-RISK TYP POOLED RSLT: CPT | Performed by: FAMILY MEDICINE

## 2024-03-05 PROCEDURE — 88175 CYTOPATH C/V AUTO FLUID REDO: CPT | Performed by: FAMILY MEDICINE

## 2024-03-05 RX ORDER — TIRZEPATIDE 2.5 MG/.5ML
2.5 INJECTION, SOLUTION SUBCUTANEOUS WEEKLY
Qty: 2 ML | Refills: 1 | Status: SHIPPED | OUTPATIENT
Start: 2024-03-05

## 2024-03-05 RX ORDER — BLOOD SUGAR DIAGNOSTIC
STRIP MISCELLANEOUS
Qty: 100 STRIP | Refills: 2 | Status: SHIPPED | OUTPATIENT
Start: 2024-03-05 | End: 2025-03-05

## 2024-03-05 RX ORDER — LANCING DEVICE/LANCETS
KIT MISCELLANEOUS
Qty: 1 KIT | Refills: 2 | Status: SHIPPED | OUTPATIENT
Start: 2024-03-05

## 2024-03-05 RX ORDER — BLOOD-GLUCOSE METER
1 EACH MISCELLANEOUS 2 TIMES DAILY
Qty: 1 KIT | Refills: 0 | Status: SHIPPED | OUTPATIENT
Start: 2024-03-05 | End: 2025-03-05

## 2024-03-05 NOTE — PROGRESS NOTES
HPI:   Lois Bernardo is a 48 year old female who presents for a complete physical exam and follow-up on diabetes.       --- Uncontrolled diabetes/obesity  Denies any numbness, tingling or pain in her feet   Was exercising regularly until 3 months ago when her best friend passed away  Still watching her diet low-carb  Today hemoglobin A1c 7.3  Urination ok   BS not checked   Saw nutritionist and RN and no glucometer prescribed  FH dad DM   Vision is good no blurring eye exam done [facility] in [place] no retinopathy  Blood pressure elevated today is under a lot of stress with grief has not yet started therapy  No family history or personal history of medullary thyroid cancer no FH MEN,  no personal history or family history of pancreatitis.  No present GERD symptoms or bowel movement issues.  BMI 30.15      --- Complete physical  Patient defers pneumonia vaccine and flu vaccine , defers further COVID boosters  Tdap 2017  Will come back for shingles vaccine next week unable to get it this week  Dental exams UTD  Eye exams [facility] 7/2023 normal  PHQ9 at a 4 is having significant grief over the past 3 months has had 2 of her best friends passed away from cancer   CAGE is normal  Sleep Apnea  No snoring  Exercise was doing 5 days a week stopped after friend passed 3 months ago  DIet still is doing well with eating diabetic nutritionist eating better   FH CAD or cancer PGM AMI; no cancer     Symptoms: denies discharge, itching, burning or dysuria, periods present amenorrhea since 10/23 last period   Abnormal pap cryo procedure >10 years ago.  Last Pap was done 7/18/2019 normal with negative HPV. 4/7/21  15 y/o son and 19 year old daughter doing well as well as her   Sexually active  no problems  Colonoscopy cologuard done 2/16/2023, no FH colon cancer no colon polyps  Last mammogram 9/9/22 is overdue  No bone density yet  Sleep or emotional difficulty no problems varies hot flashes are  interruptive sometimes at night looking to discuss estrogen replacement with a gynecologist would like referral  STD history HPV no problem since cryo-procedure over 10 years ago    Wt Readings from Last 6 Encounters:   03/05/24 184 lb (83.5 kg)   12/07/22 178 lb (80.7 kg)   12/07/22 178 lb (80.7 kg)   11/16/22 179 lb (81.2 kg)   02/09/22 180 lb (81.6 kg)   07/28/21 173 lb (78.5 kg)     Body mass index is 30.15 kg/m².       Results for orders placed or performed in visit on 03/05/24   In Office A1c POC test   Result Value Ref Range    HEMOGLOBIN A1C 7.3 (A) 4.3 - 5.6 %    Cartridge Lot# [id number] Numeric    Cartridge Expiration Date 11/30/2025 Date        Current Outpatient Medications   Medication Sig Dispense Refill    Tirzepatide (MOUNJARO) 2.5 MG/0.5ML Subcutaneous Solution Pen-injector Inject 2.5 mcg into the skin once a week. 2 mL 1    Glucose Blood (ACCU-CHEK AMBERLY PLUS) In Vitro Strip Take blood sugar fasting. 100 strip 2    Blood Glucose Monitoring Suppl (ACCU-CHEK AMBERLY PLUS) w/Device Does not apply Kit 1 Device by Other route 2 (two) times daily. 1 kit 0    Lancets Misc. (ACCU-CHEK SOFTCLIX LANCET DEV) Does not apply Kit Take blood sugars daily fasting 1 kit 2    Multiple Vitamins-Minerals (WOMENS MULTIVITAMIN OR) Take 1 tablet by mouth daily.      Ascorbic Acid (VITAMIN C OR) Take 1 tablet by mouth daily as needed.        Past Medical History:   Diagnosis Date    Acute nonsuppurative otitis media, unspecified 3/15/11    Acute upper respiratory infections of unspecified site 9/30/11    Allergic rhinitis     Cough 4/18/11    Laboratory examination ordered as part of a routine general medical examination     Blood chemistry screening    Otalgia, unspecified 9/16/10    Routine gynecological examination     Unspecified essential hypertension       Past Surgical History:   Procedure Laterality Date    COLPOSCOPY, CERVIX W/UPPER ADJACENT VAGINA; W/BIOPSY(S), CERVIX      CRYOCAUTERY OF CERVIX      D & C      OTHER  SURGICAL HISTORY  09/2018    fibroids    PAP SMEAR      Cervical      Family History   Problem Relation Age of Onset    Asthma Mother     Diabetes Father         DM    Other (liver failure[other]) Father     Other (liver transplant[other]) Father     Other (hepatitis C[other]) Father     Diabetes Paternal Grandmother       Social History:   Social History     Socioeconomic History    Marital status:    Tobacco Use    Smoking status: Never    Smokeless tobacco: Never   Vaping Use    Vaping Use: Never used   Substance and Sexual Activity    Alcohol use: Not Currently     Alcohol/week: 0.0 - 1.0 standard drinks of alcohol    Drug use: No    Sexual activity: Yes     Partners: Male     Birth control/protection: Vasectomy   Other Topics Concern     Service No    Blood Transfusions No    Caffeine Concern No     Comment: 2 [employer] weekly    Occupational Exposure No    Hobby Hazards No    Sleep Concern No    Stress Concern No    Weight Concern Yes    Special Diet Yes    Back Care No    Exercise Yes    Bike Helmet No    Seat Belt Yes    Self-Exams No     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   Exercise: Presently none plans on restarting.  Diet: Watches carbohydrates regularly     REVIEW OF SYSTEMS:   GENERAL: denies fevers, weakness, trouble sleeping or weight changes  SKIN: denies any unusual skin lesions or rashes  EYES:denies vision changes  HEENT: denies upper respiratory symptoms  LUNGS: denies cough or shortness of breath with exertion  CHEST:  denies breast changes or pain  CARDIOVASCULAR: denies chest pain or tightness on exertion: no edema  VASCULAR: denies leg cramps  GI: denies abdominal pain, bowel movement changes, blood in stool  : denies urinary problems, vaginal discharge or discomfort,  periods better not as heavy  MUSCULOSKELETAL: denies joint pain or stiffness  NEURO: denies headaches, tingling or dizziness  PSYCHE: denies depression or anxiety  HEMATOLOGIC:  denies bleeding abnormalities; history of iron deficiency  ENDOCRINE: denies temperature intolerance, polyuria, or excessive sweating.  LYMPHATICS: denies swollen glands      EXAM:   /86   Pulse 86   Temp 97 °F (36.1 °C) (Temporal)   Resp 18   Ht 5' 5.5\" (1.664 m)   Wt 184 lb (83.5 kg)   SpO2 100%   BMI 30.15 kg/m²   Body mass index is 30.15 kg/m².   GENERAL: well developed, well nourished and in no apparent distress  SKIN: no rashes,no suspicious lesions  HEENT: atraumatic, normocephalic,ears, nose and throat are normal  EYES: PERRLA, EOMI, sclera, conjunctiva are clear  NECK: supple,no adenopathy,no carotid bruits  CHEST: no chest tenderness  BREAST: symmetrical, no suspicious mass, no nipple dimpling or discharge.  LUNGS: clear to auscultation bilateral, no rales, rhonchi or wheezing  CARDIO: RRR without murmur normal S1S2  ABD:  normal bowel sounds,soft, non tender, no masses, HSM or tenderness  :  normal external labia without lesions or erythema, introitus and vaginal vault normal with no lesions, cervix no erythema, no lesions, normal discharge.  Pap smear obtained.  No cervical motion tenderness and no pelvic mass appreciated.    MUSCULOSKELETAL: gait regina,l no gross M/S defect.  EXTREMITIES: no clubbing, cyanosis, or edema  NEURO: oriented times three, cranial nerves are grossly intact, no gross motor or sensory deficit.  Bilateral barefoot skin diabetic exam is normal, visualized feet and the appearance is normal.  Bilateral monofilament/sensation of both feet is normal.  Pulsation pedal pulse exam of both lower legs/feet is normal as well.      ASSESSMENT AND PLAN:   Lois Bernardo is a 48 year old female who presents for a complete physical exam.   Encounter Diagnoses   Name Primary?    Well female exam with routine gynecological exam Yes    Uncontrolled type 2 diabetes mellitus with hyperglycemia (HCC)     Menopausal symptoms     Encounter for screening for cervical cancer      Encounter for screening for human papillomavirus (HPV)     Encounter for lipid screening for cardiovascular disease     Screening for endocrine, nutritional, metabolic and immunity disorder     Visit for screening mammogram     Post-menopausal     Screening for heart disease     Obesity (BMI 30.0-34.9)     Medication management        Orders Placed This Encounter   Procedures    Comp Metabolic Panel (14)    In Office A1c POC test    Lipid Panel [E]    TSH W Reflex To Free T4 [E]    CBC W Differential W Platelet [E]    Hpv Dna  High Risk , Thin Prep Collect    Microalb/Creat Ratio, Random Urine [E]    Influenza Refused    Zoster Vac (Shingrix) in office vaccine- Non-Medicare or Medicare with ABN    ThinPrep PAP Smear       Meds & Refills for this Visit:  Requested Prescriptions     Signed Prescriptions Disp Refills    Tirzepatide (MOUNJARO) 2.5 MG/0.5ML Subcutaneous Solution Pen-injector 2 mL 1     Sig: Inject 2.5 mcg into the skin once a week.    Glucose Blood (ACCU-CHEK AMBERLY PLUS) In Vitro Strip 100 strip 2     Sig: Take blood sugar fasting.    Blood Glucose Monitoring Suppl (ACCU-CHEK AMBERLY PLUS) w/Device Does not apply Kit 1 kit 0     Si Device by Other route 2 (two) times daily.    Lancets Misc. (ACCU-CHEK SOFTCLIX LANCET DEV) Does not apply Kit 1 kit 2     Sig: Take blood sugars daily fasting       Imaging & Consults:  INFLUENZA REFUSED [facility]  OBG - INTERNAL  AMELIE BITA 2D+3D SCREENING BILAT (CPT=77067/26200)  XR DEXA BONE DENSITOMETRY (CPT=77080)  CT CALCIUM SCORING  ZOSTER VACC RECOMBINANT IM NJX  1. Well female exam with routine gynecological exam  Pap and pelvic   .    Self breast exam explained. Health maintenance guidance given including vision and dental exams, vitamin D 1,000 iu daily with calcium 500 mg daily.  Lifestyle guidance provided recommended low fat diet and aerobic exercise 30 minutes 3-4 times weekly.    Ultrafast CT of the heart information      2. Uncontrolled type 2 diabetes mellitus  with hyperglycemia (HCC)  Obesity BMI 30  Foot exam daily  Eye exams yearly requested record from [facility] done 6/2023  No family history or personal history of medullary thyroid cancer no FH MEN,  no personal history or family history of pancreatitis.  No present GERD symptoms or bowel movement issues.  If needed use over-the-counter MiraLAX, stool softener and increase fiber if constipation symptoms.  Continue with trying to maintain water intake at least at 62 ounces per day  Small frequent meals with adequate protein goal would be 60 g/day and approximately 1200 darrius/day  Exercise tideally 30 minutes/day of aerobic exercise  Discussed increased risk for  GERD, constipation with medication.    Iedication can be increased monthly if tolerated and needed up to the optimal dose.  Follow-up in 1 month then every 3 months    - Comp Metabolic Panel (14); Future  - In Office A1c POC test hemoglobin A1c 7.3  - Lipid Panel [E]; Future  - Microalb/Creat Ratio, Random Urine [E]; Future  - Tirzepatide (MOUNJARO) 2.5 MG/0.5ML Subcutaneous Solution Pen-injector; Inject 2.5 mcg into the skin once a week.  Dispense: 2 mL; Refill: 1  - Microalb/Creat Ratio, Random Urine [E]  - Glucose Blood (ACCU-CHEK AMBERLY PLUS) In Vitro Strip; Take blood sugar fasting.  Dispense: 100 strip; Refill: 2  - Blood Glucose Monitoring Suppl (ACCU-CHEK AMBERLY PLUS) w/Device Does not apply Kit; 1 Device by Other route 2 (two) times daily.  Dispense: 1 kit; Refill: 0  - Lancets Misc. (ACCU-CHEK SOFTCLIX LANCET DEV) Does not apply Kit; Take blood sugars daily fasting  Dispense: 1 kit; Refill: 2    3. Menopausal symptoms  Patient wants to discuss options natural treatment for menopause  - OBG Referral - Edward (Cla[facility])    4. Encounter for screening for cervical cancer  - ThinPrep PAP Smear; Future  - ThinPrep PAP Smear    5. Encounter for screening for human papillomavirus (HPV)  - Hpv Dna  High Risk , Thin Prep Collect; Future  - Hpv Dna   High Risk , Thin Prep Collect    6. Encounter for lipid screening for cardiovascular disease  Lipid panel    7. Screening for endocrine, nutritional, metabolic and immunity disorder  - TSH W Reflex To Free T4 [E]; Future  - CBC W Differential W Platelet [E]; Future    8. Visit for screening mammogram  - [facility] BITA 2D+3D SCREENING BILAT (CPT=77067/26188); Future    9. Post-menopausal  Calcium, vitamin D weightbearing exercise  - XR DEXA BONE DENSITOMETRY (CPT=77080); Future    10. Screening for heart disease  - CT CALCIUM SCORING; Future    The patient indicates understanding of these issues and agrees to the plan.  The patient is asked to return for follow-up on hypertension in 1 month

## 2024-03-06 LAB — HPV I/H RISK 1 DNA SPEC QL NAA+PROBE: NEGATIVE

## 2024-03-10 LAB
.: NORMAL
.: NORMAL

## 2024-03-15 ENCOUNTER — TELEPHONE (OUTPATIENT)
Dept: FAMILY MEDICINE CLINIC | Facility: CLINIC | Age: 49
End: 2024-03-15

## 2024-03-15 NOTE — TELEPHONE ENCOUNTER
Lalitha called to report that the Accu-Chek Neda plus care kit is no longer carried by pharmacies. They were asking for replacement. Gave verbal to replace machine, strips, and lancets with whatever brand her insurance will cover.

## 2024-03-21 ENCOUNTER — HOSPITAL ENCOUNTER (OUTPATIENT)
Dept: MAMMOGRAPHY | Age: 49
Discharge: HOME | End: 2024-03-21
Attending: FAMILY MEDICINE
Payer: COMMERCIAL

## 2024-03-21 ENCOUNTER — LAB ENCOUNTER (OUTPATIENT)
Dept: LAB | Age: 49
End: 2024-03-21
Attending: FAMILY MEDICINE
Payer: COMMERCIAL

## 2024-03-21 ENCOUNTER — HOSPITAL ENCOUNTER (OUTPATIENT)
Dept: BONE DENSITY | Age: 49
Discharge: HOME | End: 2024-03-21
Attending: FAMILY MEDICINE
Payer: COMMERCIAL

## 2024-03-21 DIAGNOSIS — Z13.29 SCREENING FOR ENDOCRINE, NUTRITIONAL, METABOLIC AND IMMUNITY DISORDER: ICD-10-CM

## 2024-03-21 DIAGNOSIS — Z13.0 SCREENING FOR ENDOCRINE, NUTRITIONAL, METABOLIC AND IMMUNITY DISORDER: ICD-10-CM

## 2024-03-21 DIAGNOSIS — Z78.0 POST-MENOPAUSAL: ICD-10-CM

## 2024-03-21 DIAGNOSIS — Z13.228 SCREENING FOR ENDOCRINE, NUTRITIONAL, METABOLIC AND IMMUNITY DISORDER: ICD-10-CM

## 2024-03-21 DIAGNOSIS — E11.65 UNCONTROLLED TYPE 2 DIABETES MELLITUS WITH HYPERGLYCEMIA (HCC): ICD-10-CM

## 2024-03-21 DIAGNOSIS — Z13.21 SCREENING FOR ENDOCRINE, NUTRITIONAL, METABOLIC AND IMMUNITY DISORDER: ICD-10-CM

## 2024-03-21 DIAGNOSIS — Z12.31 VISIT FOR SCREENING MAMMOGRAM: ICD-10-CM

## 2024-03-21 LAB
ALBUMIN SERPL-MCNC: 3.9 G/DL (ref 3.4–5)
ALBUMIN/GLOB SERPL: 0.9 {RATIO} (ref 1–2)
ALP LIVER SERPL-CCNC: 80 U/L
ALT SERPL-CCNC: 32 U/L
ANION GAP SERPL CALC-SCNC: 5 MMOL/L (ref 0–18)
AST SERPL-CCNC: 20 U/L (ref 15–37)
BASOPHILS # BLD AUTO: 0.04 X10(3) UL (ref 0–0.2)
BASOPHILS NFR BLD AUTO: 0.6 %
BILIRUB SERPL-MCNC: 0.4 MG/DL (ref 0.1–2)
BUN BLD-MCNC: 14 MG/DL (ref 9–23)
CALCIUM BLD-MCNC: 9.5 MG/DL (ref 8.5–10.1)
CHLORIDE SERPL-SCNC: 107 MMOL/L (ref 98–112)
CHOLEST SERPL-MCNC: 198 MG/DL (ref ?–200)
CO2 SERPL-SCNC: 26 MMOL/L (ref 21–32)
CREAT BLD-MCNC: 0.73 MG/DL
EGFRCR SERPLBLD CKD-EPI 2021: 100 ML/MIN/1.73M2 (ref 60–?)
EOSINOPHIL # BLD AUTO: 0.33 X10(3) UL (ref 0–0.7)
EOSINOPHIL NFR BLD AUTO: 4.7 %
ERYTHROCYTE [DISTWIDTH] IN BLOOD BY AUTOMATED COUNT: 12.7 %
FASTING PATIENT LIPID ANSWER: YES
FASTING STATUS PATIENT QL REPORTED: YES
GLOBULIN PLAS-MCNC: 4.2 G/DL (ref 2.8–4.4)
GLUCOSE BLD-MCNC: 91 MG/DL (ref 70–99)
HCT VFR BLD AUTO: 44.3 %
HDLC SERPL-MCNC: 43 MG/DL (ref 40–59)
HGB BLD-MCNC: 14.3 G/DL
IMM GRANULOCYTES # BLD AUTO: 0.01 X10(3) UL (ref 0–1)
IMM GRANULOCYTES NFR BLD: 0.1 %
LDLC SERPL CALC-MCNC: 128 MG/DL (ref ?–100)
LYMPHOCYTES # BLD AUTO: 2.55 X10(3) UL (ref 1–4)
LYMPHOCYTES NFR BLD AUTO: 36.7 %
MCH RBC QN AUTO: 26.8 PG (ref 26–34)
MCHC RBC AUTO-ENTMCNC: 32.3 G/DL (ref 31–37)
MCV RBC AUTO: 83 FL
MONOCYTES # BLD AUTO: 0.46 X10(3) UL (ref 0.1–1)
MONOCYTES NFR BLD AUTO: 6.6 %
NEUTROPHILS # BLD AUTO: 3.56 X10 (3) UL (ref 1.5–7.7)
NEUTROPHILS # BLD AUTO: 3.56 X10(3) UL (ref 1.5–7.7)
NEUTROPHILS NFR BLD AUTO: 51.3 %
NONHDLC SERPL-MCNC: 155 MG/DL (ref ?–130)
OSMOLALITY SERPL CALC.SUM OF ELEC: 286 MOSM/KG (ref 275–295)
PLATELET # BLD AUTO: 248 10(3)UL (ref 150–450)
POTASSIUM SERPL-SCNC: 4 MMOL/L (ref 3.5–5.1)
PROT SERPL-MCNC: 8.1 G/DL (ref 6.4–8.2)
RBC # BLD AUTO: 5.34 X10(6)UL
SODIUM SERPL-SCNC: 138 MMOL/L (ref 136–145)
TRIGL SERPL-MCNC: 148 MG/DL (ref 30–149)
TSI SER-ACNC: 1.2 MIU/ML (ref 0.36–3.74)
VLDLC SERPL CALC-MCNC: 27 MG/DL (ref 0–30)
WBC # BLD AUTO: 7 X10(3) UL (ref 4–11)

## 2024-03-21 PROCEDURE — 84443 ASSAY THYROID STIM HORMONE: CPT

## 2024-03-21 PROCEDURE — 77067 SCR MAMMO BI INCL CAD: CPT | Performed by: FAMILY MEDICINE

## 2024-03-21 PROCEDURE — 80061 LIPID PANEL: CPT

## 2024-03-21 PROCEDURE — 77063 BREAST TOMOSYNTHESIS BI: CPT | Performed by: FAMILY MEDICINE

## 2024-03-21 PROCEDURE — 85025 COMPLETE CBC W/AUTO DIFF WBC: CPT

## 2024-03-21 PROCEDURE — 80053 COMPREHEN METABOLIC PANEL: CPT

## 2024-03-21 PROCEDURE — 36415 COLL VENOUS BLD VENIPUNCTURE: CPT

## 2024-03-21 PROCEDURE — 77080 DXA BONE DENSITY AXIAL: CPT | Performed by: FAMILY MEDICINE

## 2024-03-21 NOTE — PROGRESS NOTES
Bone density results normal in the lumbar spine, total hip and femoral neck.  Continue taking vitamin D also calcium 500 mg twice a day and weightbearing exercise

## 2024-03-21 NOTE — PROGRESS NOTES
Normal white and red blood cell counts.  Normal kidney and liver function testing.  Normal blood sugar testing.  Normal thyroid testing  LDL improved but still slightly elevated reduce saturated fats Decrease saturated fats and avoid processed foods.  Try to avoid fatty foods if eating meat try lean cuts of meat such as chicken and fish (salmon and tuna).  If able, add renetta seeds, almonds, walnuts, pumpkin seeds, sunflower seeds, beans, whole grain cereals to diet.   Cook with avocado oil, grape seed oil or olive oil. Review the Mediterranean diet on line.    Sincerely,   Prema Rodriguez PA-C

## 2024-06-06 ENCOUNTER — OFFICE VISIT (OUTPATIENT)
Dept: FAMILY MEDICINE CLINIC | Facility: CLINIC | Age: 49
End: 2024-06-06
Payer: COMMERCIAL

## 2024-06-06 ENCOUNTER — TELEPHONE (OUTPATIENT)
Dept: FAMILY MEDICINE CLINIC | Facility: CLINIC | Age: 49
End: 2024-06-06

## 2024-06-06 VITALS
SYSTOLIC BLOOD PRESSURE: 144 MMHG | WEIGHT: 175 LBS | HEART RATE: 68 BPM | HEIGHT: 65.5 IN | OXYGEN SATURATION: 98 % | TEMPERATURE: 99 F | BODY MASS INDEX: 28.81 KG/M2 | RESPIRATION RATE: 16 BRPM | DIASTOLIC BLOOD PRESSURE: 86 MMHG

## 2024-06-06 DIAGNOSIS — E78.2 MIXED HYPERLIPIDEMIA: ICD-10-CM

## 2024-06-06 DIAGNOSIS — R94.31 ABNORMAL EKG: ICD-10-CM

## 2024-06-06 DIAGNOSIS — R07.9 CHEST PAIN AT REST: ICD-10-CM

## 2024-06-06 DIAGNOSIS — E66.3 OVERWEIGHT WITH BODY MASS INDEX (BMI) 25.0-29.9: ICD-10-CM

## 2024-06-06 DIAGNOSIS — E11.9 CONTROLLED TYPE 2 DIABETES MELLITUS WITHOUT COMPLICATION, WITHOUT LONG-TERM CURRENT USE OF INSULIN (HCC): Primary | ICD-10-CM

## 2024-06-06 LAB
ATRIAL RATE: 65 BPM
HEMOGLOBIN A1C: 5.8 % (ref 4.3–5.6)
P AXIS: 63 DEGREES
P-R INTERVAL: 164 MS
Q-T INTERVAL: 414 MS
QRS DURATION: 92 MS
QTC CALCULATION (BEZET): 430 MS
R AXIS: -18 DEGREES
T AXIS: 45 DEGREES
VENTRICULAR RATE: 65 BPM

## 2024-06-06 PROCEDURE — 93000 ELECTROCARDIOGRAM COMPLETE: CPT | Performed by: FAMILY MEDICINE

## 2024-06-06 PROCEDURE — 3079F DIAST BP 80-89 MM HG: CPT | Performed by: FAMILY MEDICINE

## 2024-06-06 PROCEDURE — 3044F HG A1C LEVEL LT 7.0%: CPT | Performed by: FAMILY MEDICINE

## 2024-06-06 PROCEDURE — 99215 OFFICE O/P EST HI 40 MIN: CPT | Performed by: FAMILY MEDICINE

## 2024-06-06 PROCEDURE — 3077F SYST BP >= 140 MM HG: CPT | Performed by: FAMILY MEDICINE

## 2024-06-06 PROCEDURE — 3061F NEG MICROALBUMINURIA REV: CPT | Performed by: FAMILY MEDICINE

## 2024-06-06 PROCEDURE — 3008F BODY MASS INDEX DOCD: CPT | Performed by: FAMILY MEDICINE

## 2024-06-06 PROCEDURE — 83036 HEMOGLOBIN GLYCOSYLATED A1C: CPT | Performed by: FAMILY MEDICINE

## 2024-06-06 PROCEDURE — 3060F POS MICROALBUMINURIA REV: CPT | Performed by: FAMILY MEDICINE

## 2024-06-06 RX ORDER — LANCETS
1 EACH MISCELLANEOUS DAILY
COMMUNITY
Start: 2024-03-15

## 2024-06-06 RX ORDER — TIRZEPATIDE 2.5 MG/.5ML
2.5 INJECTION, SOLUTION SUBCUTANEOUS WEEKLY
Qty: 6 ML | Refills: 1 | Status: SHIPPED | OUTPATIENT
Start: 2024-06-06

## 2024-06-06 RX ORDER — ROSUVASTATIN CALCIUM 5 MG/1
5 TABLET, COATED ORAL
Qty: 36 TABLET | Refills: 1 | Status: SHIPPED | OUTPATIENT
Start: 2024-06-07 | End: 2024-09-05

## 2024-06-06 NOTE — PROGRESS NOTES
Lois Bernardo is a 48 year old female.  HPI:   Patient is in for follow-up on diabetes and has had some intermittent brief episodes of chest pain.      --- Resting chest pain  Pain is described as a pressure pain has happened about 2-4 times in the past month lasts between 10 minutes to an hour feels it is from stress.  Stressors best friend passed away this past year another family member passed away.  Has been very active with exercise 5 days a week states that she does not experience any chest pain, shortness of breath, dizziness or fainting spells with exercise but occasionally when she is at rest she will get some left-sided chest pain.  Denies any dizziness, dyspnea during chest pain no palpitations.   No personal history of coronary artery disease but does have history of diabetes.  At higher  risk for coronary disease secondary to diabetes  No family history of coronary disease besides paternal grandmother heart attack in her 80s    --- Diabetes  Today hemoglobin A1c is at 5.8 %   Component      Latest Ref Rng 3/5/2024 6/6/2024   HEMOGLOBIN A1C      4.3 - 5.6 % 7.3 !  5.8 !    Is on Mounjaro 2.5 mg well-tolerated no side effects only had nausea for 2 days   Less eating changed less eating with healthy diet and regular exercise 5 days a week.  Success with weight loss    No family history or personal history of medullary thyroid cancer no FH MEN,  no personal history or family history of pancreatitis.  No present GERD symptoms or bowel movement issues.  Feet  no numbness, tingling or pain  [facility] exam this summer will forward eye exam denies any vision changes denies any hypoglycemia,  Urinary changes  Low-carb diet with adequate protein does a protein shake and Greek yogurt along with lean protein fruits and vegetables.  Wants to go off of Mounjaro now that the hemoglobin A1c is normal we did discuss at length and decided on 155 pound weight goal then will discuss going off the RX     Wt  Readings from Last 6 Encounters:   06/06/24 175 lb (79.4 kg)   03/05/24 184 lb (83.5 kg)   12/07/22 178 lb (80.7 kg)   12/07/22 178 lb (80.7 kg)   11/16/22 179 lb (81.2 kg)   02/09/22 180 lb (81.6 kg)         Current Outpatient Medications   Medication Sig Dispense Refill    Microlet Lancets Does not apply Misc 1 strip by In Vitro route daily.      Tirzepatide (MOUNJARO) 2.5 MG/0.5ML Subcutaneous Solution Pen-injector Inject 2.5 mcg into the skin once a week. 6 mL 1    rosuvastatin 5 MG Oral Tab Take 1 tablet (5 mg total) by mouth 3 (three) times a week. 36 tablet 1    Glucose Blood (ACCU-CHEK AMBERLY PLUS) In Vitro Strip Take blood sugar fasting. 100 strip 2    Blood Glucose Monitoring Suppl (ACCU-CHEK AMBERLY PLUS) w/Device Does not apply Kit 1 Device by Other route 2 (two) times daily. 1 kit 0    Lancets Misc. (ACCU-CHEK SOFTCLIX LANCET DEV) Does not apply Kit Take blood sugars daily fasting 1 kit 2    Multiple Vitamins-Minerals (WOMENS MULTIVITAMIN OR) Take 1 tablet by mouth daily.      Ascorbic Acid (VITAMIN C OR) Take 1 tablet by mouth daily as needed.        Past Medical History:    Acute nonsuppurative otitis media, unspecified    Acute upper respiratory infections of unspecified site    Allergic rhinitis    Cough    Laboratory examination ordered as part of a routine general medical examination    Blood chemistry screening    Otalgia, unspecified    Routine gynecological examination    Unspecified essential hypertension      Social History:  Social History     Socioeconomic History    Marital status:    Tobacco Use    Smoking status: Never     Passive exposure: Never    Smokeless tobacco: Never   Vaping Use    Vaping status: Never Used   Substance and Sexual Activity    Alcohol use: Not Currently     Alcohol/week: 0.0 - 1.0 standard drinks of alcohol    Drug use: No    Sexual activity: Yes     Partners: Male     Birth control/protection: Vasectomy   Other Topics Concern     Service No    Blood  Transfusions No    Caffeine Concern No     Comment: 2 [employer] weekly    Occupational Exposure No    Hobby Hazards No    Sleep Concern No    Stress Concern No    Weight Concern Yes    Special Diet Yes    Back Care No    Exercise Yes    Bike Helmet No    Seat Belt Yes    Self-Exams No        REVIEW OF SYSTEMS:   GENERAL HEALTH: feels well otherwise  SKIN: denies any unusual skin lesions or rashes  RESPIRATORY: denies shortness of breath with exertion  CARDIOVASCULAR: denies chest pain on exertion, intermittent resting left-sided chest pain  GI: denies abdominal pain and denies heartburn  NEURO: denies headaches  Musculoskeletal: No motor deficits  Endocrine see above  EXAM:   /86   Pulse 68   Temp 98.9 °F (37.2 °C) (Temporal)   Resp 16   Ht 5' 5.5\" (1.664 m)   Wt 175 lb (79.4 kg)   SpO2 98%   BMI 28.68 kg/m²   GENERAL: well developed, well nourished,in no apparent distress  SKIN: no rashes,no suspicious lesions  HEENT: TM clear bilaterally, nose no congestion, throat clear no erythema without mass.   EYES: PERRLA, EOM intact, sclera clear without injection  NECK: supple,no adenopathy, thyroid normal to palpation  LUNGS: clear to auscultation no rales, rhonchi or wheezes  CARDIO: RRR without murmur  GI: Normal bowel sounds ×4 quadrant, no hepatosplenomegaly or masses, and no tenderness   EXTREMITIES: no cyanosis, clubbing or edema  Musculoskeletal: No gross deficit  Neurological: nerves II through XII grossly intact no sensorimotor deficit  Psychological: Mood and affect are normal.  Good communication skills.  ASSESSMENT AND PLAN:     Encounter Diagnoses   Name Primary?    Controlled type 2 diabetes mellitus without complication, without long-term current use of insulin (HCC) Yes    Chest pain at rest     Mixed hyperlipidemia     Abnormal EKG     Overweight with body mass index (BMI) 25.0-29.9        Orders Placed This Encounter   Procedures    POC Hgb A1C    Lipid Panel    Hemoglobin A1C       Meds &  Refills for this Visit:  Requested Prescriptions     Signed Prescriptions Disp Refills    Tirzepatide (MOUNJARO) 2.5 MG/0.5ML Subcutaneous Solution Pen-injector 6 mL 1     Sig: Inject 2.5 mcg into the skin once a week.    rosuvastatin 5 MG Oral Tab 36 tablet 1     Sig: Take 1 tablet (5 mg total) by mouth 3 (three) times a week.       Imaging & Consults:  ELECTROCARDIOGRAM, COMPLETE  CARD ECHO STRESS ECHO/REST AND STRESS(CPT=93350/58874 DM 36208)  1. Controlled type 2 diabetes mellitus without complication, without long-term current use of insulin ([facility])  Check feet daily  Eye exams yearly due for eye exam this summer will forward consult  Patient does not want to increase the Mounjaro will stay at the 2.5 mg weekly reviewed benefits and side effects hemoglobin A1c to be done in 6 months  - Tirzepatide (MOUNJARO) 2.5 MG/0.5ML Subcutaneous Solution Pen-injector; Inject 2.5 mcg into the skin once a week.  Dispense: 6 mL; Refill: 1  - EKG with interpretation and Report -IN OFFICE [[id number]]  - POC Hgb A1C due in 6 months  - Lipid Panel; Future  - Hemoglobin A1C; Future  - CARD ECHO STRESS ECHO/REST AND STRESS(CPT=93350/09684 DMG 40371); Future  Start rosuvastatin 5 mg  2. Chest pain at rest  - EKG with interpretation and Report -IN OFFICE [[id number]]  - CARD ECHO STRESS ECHO/REST AND STRESS(CPT=93350/65601 DMG 03556); Future  If pain occurs and does not resolve is to go to the emergency department  Symptoms are atypical and match more of the stress that she has been having but with history of diabetes and slightly abnormal EKG stress echocardiogram is indicated.  Reviewed EKG with Dr. Mane no significant change found from prior EKG no acute changes  3. Mixed hyperlipidemia  Reviewed medication benefits and side effects.     - rosuvastatin 5 MG Oral Tab; Take 1 tablet (5 mg total) by mouth 3 (three) times a week.  Dispense: 36 tablet; Refill: 1  - Lipid Panel; Future    4. Abnormal EKG  - CARD ECHO STRESS ECHO/REST AND  STRESS(CPT=93350/97629 [facility] 78607); Future    5. Overweight with body mass index (BMI) 25.0-29.9  Weight loss discussed patient should start exercising daily for 30-40 minutes also reducing all carbohydrates both simple and complex.  Can eat fruits and vegetables and small frequent meals of healthy combinations of protein and carbohydrate.  Avoiding packaged/processed.     Time spent was 40 minutes more than 50% was spent on counseling regarding medical conditions and treatment.  Rest of time was spent reviewing chart, reviewing blood work and radiology tests.       The patient indicates understanding of these issues and agrees to the plan.  The patient is asked to return in every 6 months

## 2024-06-07 PROBLEM — E66.9 OBESITY (BMI 30.0-34.9): Status: RESOLVED | Noted: 2024-03-05 | Resolved: 2024-06-07

## 2024-06-07 PROBLEM — E66.811 OBESITY (BMI 30.0-34.9): Status: RESOLVED | Noted: 2024-03-05 | Resolved: 2024-06-07

## 2024-06-10 ENCOUNTER — TELEPHONE (OUTPATIENT)
Dept: FAMILY MEDICINE CLINIC | Facility: CLINIC | Age: 49
End: 2024-06-10

## 2024-06-10 ENCOUNTER — PATIENT MESSAGE (OUTPATIENT)
Dept: FAMILY MEDICINE CLINIC | Facility: CLINIC | Age: 49
End: 2024-06-10

## 2024-06-10 NOTE — TELEPHONE ENCOUNTER
Mother brought clothes and duffle bag along with personal belongings for patient to have once transferred to appropriate facility. Security checked bags and belongings updated in chart.    PT bleeding for 16 days

## 2024-06-17 ENCOUNTER — TELEPHONE (OUTPATIENT)
Dept: FAMILY MEDICINE CLINIC | Facility: CLINIC | Age: 49
End: 2024-06-17

## 2024-06-17 DIAGNOSIS — K13.70 MOUTH LESION: Primary | ICD-10-CM

## 2024-06-17 NOTE — TELEPHONE ENCOUNTER
Patient called requesting a referral to see Dr. Cara Abernathy - Oral Surgery in regards to new condition, biopsy of lesion in mouth.     LOV: 6/6/2024    Patient denied appointment. Please advise.     Informed patient to allow up to 24 to 48 Business hours for a call back from a nurse.

## 2024-06-20 ENCOUNTER — HOSPITAL ENCOUNTER (OUTPATIENT)
Dept: CV DIAGNOSTICS | Age: 49
Discharge: HOME | End: 2024-06-20
Attending: FAMILY MEDICINE

## 2024-06-20 DIAGNOSIS — E11.9 CONTROLLED TYPE 2 DIABETES MELLITUS WITHOUT COMPLICATION, WITHOUT LONG-TERM CURRENT USE OF INSULIN (HCC): ICD-10-CM

## 2024-06-20 DIAGNOSIS — R07.9 CHEST PAIN AT REST: ICD-10-CM

## 2024-06-20 DIAGNOSIS — R94.31 ABNORMAL EKG: ICD-10-CM

## 2024-06-20 PROCEDURE — 93350 STRESS TTE ONLY: CPT | Performed by: FAMILY MEDICINE

## 2024-06-20 PROCEDURE — 93017 CV STRESS TEST TRACING ONLY: CPT | Performed by: FAMILY MEDICINE

## 2024-06-20 PROCEDURE — 93018 CV STRESS TEST I&R ONLY: CPT | Performed by: FAMILY MEDICINE

## 2024-06-21 NOTE — PROGRESS NOTES
Normal stress echocardiogram of note elevated blood pressure before during and after stress test.  Start monitoring blood pressure at home at least 3 times a week blood pressure should be less than 130/80.

## 2024-07-19 ENCOUNTER — LAB REQUISITION (OUTPATIENT)
Dept: LAB | Facility: HOSPITAL | Age: 49
End: 2024-07-19
Payer: COMMERCIAL

## 2024-07-19 DIAGNOSIS — D48.5 NEOPLASM OF UNCERTAIN BEHAVIOR OF SKIN: ICD-10-CM

## 2024-07-19 PROCEDURE — 88305 TISSUE EXAM BY PATHOLOGIST: CPT | Performed by: DENTIST

## 2024-07-25 ENCOUNTER — OFFICE VISIT (OUTPATIENT)
Dept: FAMILY MEDICINE CLINIC | Facility: CLINIC | Age: 49
End: 2024-07-25
Payer: COMMERCIAL

## 2024-07-25 VITALS
HEIGHT: 65.5 IN | HEART RATE: 89 BPM | TEMPERATURE: 98 F | BODY MASS INDEX: 27.65 KG/M2 | WEIGHT: 168 LBS | SYSTOLIC BLOOD PRESSURE: 152 MMHG | DIASTOLIC BLOOD PRESSURE: 100 MMHG | RESPIRATION RATE: 18 BRPM | OXYGEN SATURATION: 97 %

## 2024-07-25 DIAGNOSIS — I10 PRIMARY HYPERTENSION: Primary | ICD-10-CM

## 2024-07-25 DIAGNOSIS — E66.3 OVERWEIGHT WITH BODY MASS INDEX (BMI) 25.0-29.9: ICD-10-CM

## 2024-07-25 DIAGNOSIS — E78.2 MIXED HYPERLIPIDEMIA: ICD-10-CM

## 2024-07-25 DIAGNOSIS — E11.9 CONTROLLED TYPE 2 DIABETES MELLITUS WITHOUT COMPLICATION, WITHOUT LONG-TERM CURRENT USE OF INSULIN (HCC): ICD-10-CM

## 2024-07-25 DIAGNOSIS — Z79.899 MEDICATION MANAGEMENT: ICD-10-CM

## 2024-07-25 PROCEDURE — 3080F DIAST BP >= 90 MM HG: CPT | Performed by: FAMILY MEDICINE

## 2024-07-25 PROCEDURE — 99214 OFFICE O/P EST MOD 30 MIN: CPT | Performed by: FAMILY MEDICINE

## 2024-07-25 PROCEDURE — 3008F BODY MASS INDEX DOCD: CPT | Performed by: FAMILY MEDICINE

## 2024-07-25 PROCEDURE — 3077F SYST BP >= 140 MM HG: CPT | Performed by: FAMILY MEDICINE

## 2024-07-25 RX ORDER — BACILLUS COAGULANS/INULIN 1B-250 MG
CAPSULE ORAL
COMMUNITY

## 2024-07-25 RX ORDER — LOSARTAN POTASSIUM 25 MG/1
25 TABLET ORAL DAILY
Qty: 90 TABLET | Refills: 0 | Status: SHIPPED | OUTPATIENT
Start: 2024-07-25

## 2024-07-25 NOTE — PROGRESS NOTES
Lois Bernardo is a 49 year old female.  HPI:   Patient is in for follow-up on abnormal stress test secondary to elevated blood pressure.      --- Hypertension  No heart scan completed yet but did do stress echocardiogram 6/20/2024 normal  3/21/2024 TSH 1.2  3/21/2024 CMP  electrolytes normal, calcium normal 9.5  Anxiety has been significant this past year did start doing counseling.    Best friend and another family member passed away along with other  life stressors.  States that due to the anxiety she feels this is elevated her blood pressure  Also has comorbid conditions with diabetes  Stress echo normal consistent elevated BP during testing  Chest pain that she had at prior office visit resolved since starting was able to find the same 1 from 2 years ago  Denies any headaches, blurred vision, chest pressure, dizziness, fainting spells  No personal or family history known for significant coronary disease besides paternal grandmother heart attack in her mid 80s  Reviewed home blood pressures   7/18/2024 @ 175/95,   7/21/2024 @ 176/112   7/22/2024 @188/117  7/23/2024 @176/125    Stress echocardiogram completed 6/20/2022  INTERPRETATION: This patient exercised for 8 minutes and 59 seconds on a Nolberto protocol, stopping due to fatigue.  Patient achieved a peak heart rate of 155 beats per minute (91% of their APMHR) and a peak blood pressure of 182/86 mmHg.  There were 8.2 METs achieved, which is average for their age.  The EKG was negative for ischemia. No ST depression or angina occurred.  Patient denied cardiac symptoms.  No arrhythmias noted.     Resting echocardiographic images revealed normal chamber sizes and valvular structure with uniformly normal left ventricular contractility.  The left ventricular ejection fraction is visually estimated at 60%.     Immediate post exercise imaging revealed a decrease in left ventricular size and increased contractility of all wall segments consistent with a normal  stress echocardiogram response.     IMPRESSION:  Normal stress echocardiogram.            ---- Diabetes last visit 6/6/2024 no changes  6/6/2024 hemoglobin A1c  is at 5.8 % with Mounjaro  3/21/2024 elevated urine microalbumin creatinine ratio at 60.6 due for repeat  Component      Latest Ref Rng 3/5/2024 6/6/2024   HEMOGLOBIN A1C      4.3 - 5.6 % 7.3 !  5.8 !    Is on Mounjaro 2.5 mg started 3/9/2024 well-tolerated no side effects   Less eating changed less eating with healthy diet and regular exercise 5 days a week.  No family history or personal history of medullary thyroid cancer no FH MEN,  no personal history or family history of pancreatitis.  No present GERD symptoms or bowel movement issues.  Feet  no numbness, tingling or pain  [facility] exam this summer will forward eye exam denies any vision changes denies any hypoglycemia,  Urinary changes  Low-carb diet with adequate protein does a protein shake and Greek yogurt along with lean protein fruits and vegetables.  Has had improvement with weight was originally 184 pounds now 168 pounds.  Doing great with diet and exercise  Wt Readings from Last 6 Encounters:   07/25/24 168 lb (76.2 kg)   06/06/24 175 lb (79.4 kg)   03/05/24 184 lb (83.5 kg)   12/07/22 178 lb (80.7 kg)   12/07/22 178 lb (80.7 kg)   11/16/22 179 lb (81.2 kg)       Had tongue biopsied and was benign as below  Results for orders placed or performed in visit on 07/19/24   Specimen to Pathology, Tissue   Result Value Ref Range    Case Report       Surgical Pathology                                Case: [id number]                                   Authorizing Provider:  Cara Christiansen DMD  Collected:           07/19/2024                   Ordering Location:     [facility]     Received:            07/20/2024 11:06 PM                                 [place], [facility]                                                                    Pathologist:           Jah Van MD                                                             Specimens:   A) - Tongue, Tongue, right                                                                          B) - Tongue, Tongue, left                                                                  Final Diagnosis:       A.  Right tongue, incisional biopsy:  -Squamous mucosa with lichenoid chronic inflammation.  -No evidence of dysplasia or malignancy seen.  -See comment.    B.  Left tongue, excisional biopsy:  -Squamous mucosa with lichenoid chronic inflammation.  -No evidence of dysplasia or malignancy.  -See comment.      Embedded Images      Final Diagnosis Comment       A, B.  Both specimens show similar features and are discussed together.  Sections show squamous epithelium with mild hyperplasia and focal parakeratosis.  There is superficial lichenoid stromal chronic inflammation formed predominantly of lymphocytes.  There is focal interface activity with basilar keratinocyte dyskeratosis.    Diagnostic considerations include oral lichen planus and lichenoid drug reaction.  There is no evidence of dysplasia or malignancy.      Clinical Information       D48.5 Neoplasm Of Uncertain Behavior Of Skin.         Gross Description       A.  Labeled with patient's name and medical record number, right tongue, received in formalin: Specimen consists of a 0.5 x 0.4 x 0.2 cm piece of light tan tissue.  The specimen is bisected and is submitted in toto in cassette A1..     B.  Labeled with patient's name and medical record number, left tongue, received in formalin: Specimen consists of a 0.6 x 0.5 x 0.2 cm piece of light tan tissue.  The specimen is bisected and is submitted in toto in cassette B1.  (ZQ).         Current Outpatient Medications   Medication Sig Dispense Refill    Bacillus Coagulans-Inulin (PROBIOTIC) 1-250 BILLION-MG Oral Cap       [place] Aguiar 550 MG  Oral Cap       Progesterone 100 MG Vaginal Suppos       Misc Natural Products (BEET ROOT OR) Take by mouth.      losartan 25 MG Oral Tab Take 1 tablet (25 mg total) by mouth daily. 90 tablet 0    Microlet Lancets Does not apply Misc 1 strip by In Vitro route daily.      Tirzepatide (MOUNJARO) 2.5 MG/0.5ML Subcutaneous Solution Pen-injector Inject 2.5 mcg into the skin once a week. 6 mL 1    rosuvastatin 5 MG Oral Tab Take 1 tablet (5 mg total) by mouth 3 (three) times a week. 36 tablet 1    Glucose Blood (ACCU-CHEK AMBERLY PLUS) In Vitro Strip Take blood sugar fasting. 100 strip 2    Blood Glucose Monitoring Suppl (ACCU-CHEK AMBERLY PLUS) w/Device Does not apply Kit 1 Device by Other route 2 (two) times daily. 1 kit 0    Lancets Misc. (ACCU-CHEK SOFTCLIX LANCET DEV) Does not apply Kit Take blood sugars daily fasting 1 kit 2    Multiple Vitamins-Minerals (WOMENS MULTIVITAMIN OR) Take 1 tablet by mouth daily.      Ascorbic Acid (VITAMIN C OR) Take 1 tablet by mouth daily as needed.        Past Medical History:    Acute nonsuppurative otitis media, unspecified    Acute upper respiratory infections of unspecified site    Allergic rhinitis    Cough    Laboratory examination ordered as part of a routine general medical examination    Blood chemistry screening    Otalgia, unspecified    Routine gynecological examination    Unspecified essential hypertension      Social History:  Social History     Socioeconomic History    Marital status:    Tobacco Use    Smoking status: Never     Passive exposure: Never    Smokeless tobacco: Never   Vaping Use    Vaping status: Never Used   Substance and Sexual Activity    Alcohol use: Not Currently     Alcohol/week: 0.0 - 1.0 standard drinks of alcohol    Drug use: No    Sexual activity: Yes     Partners: Male     Birth control/protection: Vasectomy   Other Topics Concern     Service No    Blood Transfusions No    Caffeine Concern No     Comment: 2 [employer] weekly     Occupational Exposure No    Hobby Hazards No    Sleep Concern No    Stress Concern No    Weight Concern Yes    Special Diet Yes    Back Care No    Exercise Yes    Bike Helmet No    Seat Belt Yes    Self-Exams No        REVIEW OF SYSTEMS:   GENERAL HEALTH: feels well otherwise  SKIN: denies any unusual skin lesions or rashes  RESPIRATORY: denies shortness of breath with exertion  CARDIOVASCULAR: denies chest pain on exertion  GI: denies abdominal pain and denies heartburn  NEURO: denies headaches  Musculoskeletal: No motor deficits  Psych anxiety see above doing counseling  EXAM:   BP (!) 152/100 (BP Location: Left arm, Patient Position: Sitting, Cuff Size: adult)   Pulse 89   Temp 97.5 °F (36.4 °C)   Resp 18   Ht 5' 5.5\" (1.664 m)   Wt 168 lb (76.2 kg)   SpO2 97%   BMI 27.53 kg/m²   GENERAL: well developed, well nourished,in no apparent distress  SKIN: no rashes,no suspicious lesions  EYES: PERRLA, EOM intact, sclera clear without injection  NECK: supple,no adenopathy, thyroid normal to palpation  LUNGS: clear to auscultation no rales, rhonchi or wheezes  CARDIO: RRR without murmur  GI: Normal bowel sounds ×4 quadrant, no hepatosplenomegaly or masses, and no tenderness   EXTREMITIES: no cyanosis, clubbing or edema  Musculoskeletal: No gross deficit  Neurological: nerves II through XII grossly intact no sensorimotor deficit  Psychological: Mood is calmer less anxious than last visits her affect is normal.  Good communication skills very pleasant to talk to.  ASSESSMENT AND PLAN:     Encounter Diagnoses   Name Primary?    Primary hypertension Yes    Controlled type 2 diabetes mellitus without complication, without long-term current use of insulin (HCC)     Mixed hyperlipidemia     Overweight with body mass index (BMI) 25.0-29.9     Medication management        Orders Placed This Encounter   Procedures    Cortisol [E]    Metanephrines, Plasma Free    Comp Metabolic Panel (14) [E]    TSH and Free T4       Meds &  Refills for this Visit:  Requested Prescriptions     Signed Prescriptions Disp Refills    losartan 25 MG Oral Tab 90 tablet 0     Sig: Take 1 tablet (25 mg total) by mouth daily.       Imaging & Consults:  None  1. Primary hypertension  DASH diet   Continue with the exercise and low-sodium diet  Start losartan 25 mg daily send blood pressure readings in 1 week   has been checking blood pressure for her since she gets anxious when she checks it herself will forward blood pressure readings through [employer] if above 140/90 will increase losartan to 50 mg.  Continue with counseling with present anxiety situation.  - Cortisol [E]; Future  - Metanephrines, Plasma Free; Future  - Comp Metabolic Panel (14) [E]; Future  - losartan 25 MG Oral Tab; Take 1 tablet (25 mg total) by mouth daily.  Dispense: 90 tablet; Refill: 0  - TSH and Free T4; Future    2. Controlled type 2 diabetes mellitus with elevated urine microalbumin creatinine protein ratio without long-term current use of insulin (HCC)  Eye exams overdue  Continue with Mounjaro 2.5 mg  Continue with checking feet daily  Continue with monitoring blood sugars  Hemoglobin A1c is due 12/7/2024  Patient is to get CMP and urine microalbumin creatinine ratio completed  3. Mixed hyperlipidemia  Continue with rosuvastatin  Get Ultrafast CT of the heart as ordered    4. Overweight with body mass index (BMI) 25.0-29.9  Continue with healthy lifestyle with exercise and low carbs    5. Medication management  As above  Time spent was 30 minutes more than 50% was spent on counseling regarding medical conditions and treatment.  Rest of time was spent reviewing chart, reviewing blood work and radiology tests.       The patient indicates understanding of these issues and agrees to the plan.  The patient is asked to return in 1 month appointment made for 9/17/2024 hypertension follow-up.

## 2024-07-26 PROBLEM — Z79.899 MEDICATION MANAGEMENT: Status: ACTIVE | Noted: 2024-07-26

## 2024-07-26 PROBLEM — E11.29 TYPE 2 DIABETES MELLITUS WITH PROTEINURIA (HCC): Status: ACTIVE | Noted: 2024-07-26

## 2024-07-26 PROBLEM — E11.9 CONTROLLED TYPE 2 DIABETES MELLITUS WITHOUT COMPLICATION, WITHOUT LONG-TERM CURRENT USE OF INSULIN (HCC): Status: ACTIVE | Noted: 2024-07-26

## 2024-07-26 PROBLEM — R80.9 TYPE 2 DIABETES MELLITUS WITH PROTEINURIA (HCC): Status: ACTIVE | Noted: 2024-07-26

## 2024-07-26 PROBLEM — I10 PRIMARY HYPERTENSION: Status: ACTIVE | Noted: 2024-07-26

## 2024-07-26 PROBLEM — E78.2 MIXED HYPERLIPIDEMIA: Status: ACTIVE | Noted: 2024-07-26

## 2024-07-26 PROBLEM — E66.3 OVERWEIGHT WITH BODY MASS INDEX (BMI) 25.0-29.9: Status: ACTIVE | Noted: 2024-07-26

## 2024-08-06 ENCOUNTER — LAB ENCOUNTER (OUTPATIENT)
Dept: LAB | Age: 49
End: 2024-08-06
Attending: FAMILY MEDICINE
Payer: COMMERCIAL

## 2024-08-06 DIAGNOSIS — E11.9 CONTROLLED TYPE 2 DIABETES MELLITUS WITHOUT COMPLICATION, WITHOUT LONG-TERM CURRENT USE OF INSULIN (HCC): ICD-10-CM

## 2024-08-06 DIAGNOSIS — I10 PRIMARY HYPERTENSION: ICD-10-CM

## 2024-08-06 DIAGNOSIS — E78.2 MIXED HYPERLIPIDEMIA: ICD-10-CM

## 2024-08-06 LAB
ALBUMIN SERPL-MCNC: 4.8 G/DL (ref 3.2–4.8)
ALBUMIN/GLOB SERPL: 1.5 {RATIO} (ref 1–2)
ALP LIVER SERPL-CCNC: 80 U/L
ALT SERPL-CCNC: 21 U/L
ANION GAP SERPL CALC-SCNC: 8 MMOL/L (ref 0–18)
AST SERPL-CCNC: 22 U/L (ref ?–34)
BILIRUB SERPL-MCNC: 0.3 MG/DL (ref 0.3–1.2)
BUN BLD-MCNC: 13 MG/DL (ref 9–23)
CALCIUM BLD-MCNC: 10 MG/DL (ref 8.7–10.4)
CHLORIDE SERPL-SCNC: 107 MMOL/L (ref 98–112)
CHOLEST SERPL-MCNC: 164 MG/DL (ref ?–200)
CO2 SERPL-SCNC: 25 MMOL/L (ref 21–32)
CORTIS SERPL-MCNC: 25.6 UG/DL
CREAT BLD-MCNC: 0.83 MG/DL
CREAT UR-SCNC: 76.8 MG/DL
EGFRCR SERPLBLD CKD-EPI 2021: 85 ML/MIN/1.73M2 (ref 60–?)
FASTING PATIENT LIPID ANSWER: YES
FASTING STATUS PATIENT QL REPORTED: YES
GLOBULIN PLAS-MCNC: 3.2 G/DL (ref 2–3.5)
GLUCOSE BLD-MCNC: 96 MG/DL (ref 70–99)
HDLC SERPL-MCNC: 46 MG/DL (ref 40–59)
LDLC SERPL CALC-MCNC: 93 MG/DL (ref ?–100)
MICROALBUMIN UR-MCNC: 4.6 MG/DL
MICROALBUMIN/CREAT 24H UR-RTO: 59.9 UG/MG (ref ?–30)
NONHDLC SERPL-MCNC: 118 MG/DL (ref ?–130)
OSMOLALITY SERPL CALC.SUM OF ELEC: 290 MOSM/KG (ref 275–295)
POTASSIUM SERPL-SCNC: 4.1 MMOL/L (ref 3.5–5.1)
PROT SERPL-MCNC: 8 G/DL (ref 5.7–8.2)
SODIUM SERPL-SCNC: 140 MMOL/L (ref 136–145)
T4 FREE SERPL-MCNC: 1.2 NG/DL (ref 0.8–1.7)
TRIGL SERPL-MCNC: 142 MG/DL (ref 30–149)
TSI SER-ACNC: 2.07 MIU/ML (ref 0.55–4.78)
VLDLC SERPL CALC-MCNC: 23 MG/DL (ref 0–30)

## 2024-08-06 PROCEDURE — 84439 ASSAY OF FREE THYROXINE: CPT

## 2024-08-06 PROCEDURE — 82533 TOTAL CORTISOL: CPT

## 2024-08-06 PROCEDURE — 36415 COLL VENOUS BLD VENIPUNCTURE: CPT

## 2024-08-06 PROCEDURE — 80053 COMPREHEN METABOLIC PANEL: CPT

## 2024-08-06 PROCEDURE — 82043 UR ALBUMIN QUANTITATIVE: CPT

## 2024-08-06 PROCEDURE — 82570 ASSAY OF URINE CREATININE: CPT

## 2024-08-06 PROCEDURE — 83835 ASSAY OF METANEPHRINES: CPT

## 2024-08-06 PROCEDURE — 84443 ASSAY THYROID STIM HORMONE: CPT

## 2024-08-06 PROCEDURE — 80061 LIPID PANEL: CPT

## 2024-08-07 ENCOUNTER — PATIENT MESSAGE (OUTPATIENT)
Dept: FAMILY MEDICINE CLINIC | Facility: CLINIC | Age: 49
End: 2024-08-07

## 2024-08-07 NOTE — TELEPHONE ENCOUNTER
From: Lois Bernardo  To: Prema Rodriguez  Sent: 8/7/2024 10:14 AM CDT  Subject: Cortisol    Hello, I saw the notes on the message. I wanted to ask about the cortisol levels, what were the results?

## 2024-08-07 NOTE — PROGRESS NOTES
Urine microalbumin creatinine ratio is unchanged from 5 months ago we will continue to monitor.  Losartan the blood pressure medication should help to reduce this number will repeat in 6 months.  Lipid panel looks great  Kidney function and liver function tests are normal.  Thyroid testing is normal

## 2024-08-07 NOTE — PROGRESS NOTES
Secondary to very mildly elevated cortisol levels (normal being up to 22.45 your level is at 25.6) I can refer you to an endocrinologist to discuss.  Unclear if this will be anything with the stress you have had cortisol levels can be elevated from stress.  Another option would be us to repeat it again in 2 to 3 months to see if the numbers have normalized after some of the stress comes down.  Let me know if you would like to get the referral for the endocrinologist or wait and repeat in a couple of months.

## 2024-08-13 NOTE — TELEPHONE ENCOUNTER
Patient sent an attachment of her blood pressure readings through [employer]. Please review and advise

## 2024-08-15 LAB
METANEPHRINE: 31 PG/ML
NORMETANEPHRINE: 62.4 PG/ML

## 2024-09-02 DIAGNOSIS — E11.9 CONTROLLED TYPE 2 DIABETES MELLITUS WITHOUT COMPLICATION, WITHOUT LONG-TERM CURRENT USE OF INSULIN (HCC): ICD-10-CM

## 2024-09-05 ENCOUNTER — PATIENT MESSAGE (OUTPATIENT)
Dept: FAMILY MEDICINE CLINIC | Facility: CLINIC | Age: 49
End: 2024-09-05

## 2024-09-05 ENCOUNTER — HOSPITAL ENCOUNTER (OUTPATIENT)
Dept: ULTRASOUND IMAGING | Age: 49
Discharge: HOME | End: 2024-09-05
Attending: FAMILY MEDICINE
Payer: COMMERCIAL

## 2024-09-05 DIAGNOSIS — I1A.0 RESISTANT HYPERTENSION: ICD-10-CM

## 2024-09-05 PROCEDURE — 76775 US EXAM ABDO BACK WALL LIM: CPT | Performed by: FAMILY MEDICINE

## 2024-09-05 PROCEDURE — 93975 VASCULAR STUDY: CPT | Performed by: FAMILY MEDICINE

## 2024-09-05 NOTE — TELEPHONE ENCOUNTER
From: Lois Bernardo  To: Prema Rodriguez  Sent: 9/5/2024 12:36 AM CDT  Subject: Mounjaro    Hello, the pharmacy wasn't able to refill the prescription for mounjaro due to having to be approved by insurance. I was wondering is there anything I need to do? Thanks

## 2024-09-17 ENCOUNTER — OFFICE VISIT (OUTPATIENT)
Dept: FAMILY MEDICINE CLINIC | Facility: CLINIC | Age: 49
End: 2024-09-17
Payer: COMMERCIAL

## 2024-09-17 VITALS
BODY MASS INDEX: 32.51 KG/M2 | OXYGEN SATURATION: 98 % | HEART RATE: 74 BPM | HEIGHT: 60.5 IN | SYSTOLIC BLOOD PRESSURE: 122 MMHG | WEIGHT: 170 LBS | DIASTOLIC BLOOD PRESSURE: 74 MMHG | RESPIRATION RATE: 16 BRPM | TEMPERATURE: 98 F

## 2024-09-17 DIAGNOSIS — I10 PRIMARY HYPERTENSION: ICD-10-CM

## 2024-09-17 DIAGNOSIS — E11.9 CONTROLLED TYPE 2 DIABETES MELLITUS WITHOUT COMPLICATION, WITHOUT LONG-TERM CURRENT USE OF INSULIN (HCC): Primary | ICD-10-CM

## 2024-09-17 DIAGNOSIS — E78.2 MIXED HYPERLIPIDEMIA: ICD-10-CM

## 2024-09-17 DIAGNOSIS — Z79.899 MEDICATION MANAGEMENT: ICD-10-CM

## 2024-09-17 PROCEDURE — G2211 COMPLEX E/M VISIT ADD ON: HCPCS | Performed by: FAMILY MEDICINE

## 2024-09-17 PROCEDURE — 3074F SYST BP LT 130 MM HG: CPT | Performed by: FAMILY MEDICINE

## 2024-09-17 PROCEDURE — 3008F BODY MASS INDEX DOCD: CPT | Performed by: FAMILY MEDICINE

## 2024-09-17 PROCEDURE — 99214 OFFICE O/P EST MOD 30 MIN: CPT | Performed by: FAMILY MEDICINE

## 2024-09-17 PROCEDURE — 3060F POS MICROALBUMINURIA REV: CPT | Performed by: FAMILY MEDICINE

## 2024-09-17 PROCEDURE — 3061F NEG MICROALBUMINURIA REV: CPT | Performed by: FAMILY MEDICINE

## 2024-09-17 PROCEDURE — 3078F DIAST BP <80 MM HG: CPT | Performed by: FAMILY MEDICINE

## 2024-09-17 RX ORDER — CHLORHEXIDINE GLUCONATE ORAL RINSE 1.2 MG/ML
SOLUTION DENTAL
COMMUNITY
Start: 2024-07-11

## 2024-09-17 RX ORDER — LOSARTAN POTASSIUM 50 MG/1
50 TABLET ORAL DAILY
Qty: 90 TABLET | Refills: 1 | Status: SHIPPED | OUTPATIENT
Start: 2024-09-17

## 2024-09-17 RX ORDER — TIRZEPATIDE 5 MG/.5ML
5 INJECTION, SOLUTION SUBCUTANEOUS WEEKLY
Qty: 2 ML | Refills: 0 | Status: SHIPPED | OUTPATIENT
Start: 2024-09-17 | End: 2024-10-09

## 2024-09-17 RX ORDER — ROSUVASTATIN CALCIUM 5 MG/1
5 TABLET, COATED ORAL
Qty: 36 TABLET | Refills: 1 | COMMUNITY
Start: 2024-09-17 | End: 2024-09-17

## 2024-09-17 NOTE — PROGRESS NOTES
Lois Bernardo is a 49 year old female.  HPI:      --- Hypertension  On amlodipine 5 mg, losartan 50 mg  Blood pressure today 122/74 normal  No heart scan completed yet but did do stress echocardiogram 6/20/2024 normal  8/6/2024  TSH 2.0   8/6/2024 GFR 85, electrolytes normal, calcium normal   Anxiety has been significant this past year did start doing grief counseling and is doing better  Best friend and another family member passed away along with other  life stressors.  Is now taking care of her nephew who is 13 years old his mother is in hospice   Stress echo normal consistent elevated BP during testing  Denies any headaches, blurred vision, chest pressure, dizziness, fainting spells  No personal or family history known for significant coronary disease besides paternal grandmother heart attack in her mid 80s     ---- Diabetes   6/6/2024 hemoglobin A1c  is at 5.8 % with Mounjaro  3/21/2024 elevated urine microalbumin creatinine ratio at 60.6 due for repeat  Component      Latest Ref Rng 3/5/2024 6/6/2024   HEMOGLOBIN A1C      4.3 - 5.6 % 7.3 !  5.8 !    Is on Mounjaro 2.5 mg started 3/9/2024 well-tolerated no side effects has been off of it for a week needs the 5 mg sent over prescription plan will not cover another of the 2.5 mg needs to go up to the 5 mg dose.  Has tolerated medication well did not get any nausea, vomiting, diarrhea, constipation or GERD  Less eating changed less eating with healthy diet and regular exercise 5 days a week.  No family history or personal history of medullary thyroid cancer no FH MEN,  no personal history or family history of pancreatitis.  No present GERD symptoms or bowel movement issues.  Feet  no numbness, tingling or pain  [facility] exam this summer will forward eye exam denies any vision changes denies any hypoglycemia,    Has had improvement with weight was originally 184 pounds now 170 pounds.              Current Outpatient Medications   Medication Sig  Dispense Refill    chlorhexidine gluconate 0.12 % Mouth/Throat Solution RINSE MOUTH WITH 15 ML TWICE DAILY. NO FOOD OR DRINK FOR 1 HR      Tirzepatide (MOUNJARO) 5 MG/0.5ML Subcutaneous Solution Pen-injector Inject 5 mg into the skin once a week for 4 doses. 2 mL 0    rosuvastatin 5 MG Oral Tab Take 1 tablet (5 mg total) by mouth 3 (three) times a week. 36 tablet 1    losartan 50 MG Oral Tab Take 1 tablet (50 mg total) by mouth daily. 90 tablet 1    amLODIPine 5 MG Oral Tab Take 1 tablet (5 mg total) by mouth daily. 90 tablet 0    Bacillus Coagulans-Inulin (PROBIOTIC) 1-250 BILLION-MG Oral Cap       Progesterone 100 MG Vaginal Suppos       Misc Natural Products (BEET ROOT OR) Take by mouth.      Multiple Vitamins-Minerals (WOMENS MULTIVITAMIN OR) Take 1 tablet by mouth daily.      Ascorbic Acid (VITAMIN C OR) Take 1 tablet by mouth daily as needed.      Eduar Aguiar 550 MG Oral Cap       Microlet Lancets Does not apply Misc 1 strip by In Vitro route daily. (Patient not taking: Reported on 9/17/2024)      Glucose Blood (ACCU-CHEK AMBERLY PLUS) In Vitro Strip Take blood sugar fasting. (Patient not taking: Reported on 9/17/2024) 100 strip 2    Blood Glucose Monitoring Suppl (ACCU-CHEK AMBERLY PLUS) w/Device Does not apply Kit 1 Device by Other route 2 (two) times daily. (Patient not taking: Reported on 9/17/2024) 1 kit 0    Lancets Misc. (ACCU-CHEK SOFTCLIX LANCET DEV) Does not apply Kit Take blood sugars daily fasting (Patient not taking: Reported on 9/17/2024) 1 kit 2      Past Medical History:    Acute nonsuppurative otitis media, unspecified    Acute upper respiratory infections of unspecified site    Allergic rhinitis    Cough    Laboratory examination ordered as part of a routine general medical examination    Blood chemistry screening    Otalgia, unspecified    Routine gynecological examination    Unspecified essential hypertension      Social History:  Social History     Socioeconomic History    Marital status:     Tobacco Use    Smoking status: Never     Passive exposure: Never    Smokeless tobacco: Never   Vaping Use    Vaping status: Never Used   Substance and Sexual Activity    Alcohol use: Not Currently     Alcohol/week: 0.0 - 1.0 standard drinks of alcohol    Drug use: No    Sexual activity: Yes     Partners: Male     Birth control/protection: Vasectomy   Other Topics Concern     Service No    Blood Transfusions No    Caffeine Concern No     Comment: 2 [employer] weekly    Occupational Exposure No    Hobby Hazards No    Sleep Concern No    Stress Concern No    Weight Concern Yes    Special Diet Yes    Back Care No    Exercise Yes    Bike Helmet No    Seat Belt Yes    Self-Exams No        REVIEW OF SYSTEMS:   GENERAL HEALTH: feels well otherwise  SKIN: denies any unusual skin lesions or rashes  RESPIRATORY: denies shortness of breath with exertion  CARDIOVASCULAR: denies chest pain on exertion  GI: denies abdominal pain and denies heartburn  NEURO: denies headaches  Musculoskeletal: No motor deficits  Endocrine diabetes  EXAM:   /74   Pulse 74   Temp 97.9 °F (36.6 °C) (Temporal)   Resp 16   Ht 5' 0.5\" (1.537 m)   Wt 170 lb (77.1 kg)   SpO2 98%   BMI 32.65 kg/m²   GENERAL: well developed, well nourished,in no apparent distress  SKIN: no rashes,no suspicious lesions  EYES: PERRLA, EOM intact, sclera clear without injection  NECK: supple,no adenopathy, thyroid normal to palpation  LUNGS: clear to auscultation no rales, rhonchi or wheezes  CARDIO: RRR without murmur  GI: Normal bowel sounds ×4 quadrant, no hepatosplenomegaly or masses, and no tenderness   EXTREMITIES: no cyanosis, clubbing or edema  Musculoskeletal: No gross deficit  Neurological: nerves II through XII grossly intact no sensorimotor deficit  Psychological: Mood and affect are normal.  Good communication skills.  ASSESSMENT AND PLAN:     Encounter Diagnoses   Name Primary?    Controlled type 2 diabetes mellitus without  complication, without long-term current use of insulin (HCC) Yes    Primary hypertension     Mixed hyperlipidemia     Medication management        No orders of the defined types were placed in this encounter.      Meds & Refills for this Visit:  Requested Prescriptions     Signed Prescriptions Disp Refills    Tirzepatide (MOUNJARO) 5 MG/0.5ML Subcutaneous Solution Pen-injector 2 mL 0     Sig: Inject 5 mg into the skin once a week for 4 doses.    losartan 50 MG Oral Tab 90 tablet 1     Sig: Take 1 tablet (50 mg total) by mouth daily.       Imaging & Consults:  None  1. Controlled type 2 diabetes mellitus without complication, without long-term current use of insulin (HCC)  Continue with checking feet daily  Obtain eye exam results from [facility] in [place]  Continue with checking feet daily  Mounjaro 5 mg weekly we will stay at this dose if continues to tolerate  Hemoglobin A1c due 12/2024  Continue with losartan 50 mg  Continue with rosuvastatin 5 mg takes 2-3 times a week  No family history or personal history of medullary thyroid cancer no FH MEN,  no personal history or family history of pancreatitis.  No present GERD symptoms or bowel movement issues.  If needed use over-the-counter MiraLAX, stool softener and increase fiber if constipation symptoms.  Continue with trying to maintain water intake at least at 62 ounces per day  Small frequent meals with adequate protein goal would be 60 g/day and approximately 1200 darrius/day  Exercise ideally 30 minutes/day of aerobic exercise  Discussed increased risk for gallbladder disease, GERD, constipation with medication.    If needs anesthesia no medication 1 week before anesthesia  Medication can be increased monthly if tolerated and needed up to the optimal dose.  Follow-up in 1 month then every 3 months    - Tirzepatide (MOUNJARO) 5 MG/0.5ML Subcutaneous Solution Pen-injector; Inject 5 mg into the skin once a week for 4 doses.  Dispense: 2 mL; Refill: 0    2. Primary  hypertension  Continue with losartan 50 mg and amlodipine 5 mg  - losartan 50 MG Oral Tab; Take 1 tablet (50 mg total) by mouth daily.  Dispense: 90 tablet; Refill: 1    3. Mixed hyperlipidemia  - rosuvastatin 5 MG Oral Tab; Take 1 tablet (5 mg total) by mouth 3 (three) times a week.  Dispense: 36 tablet; Refill: 1    4. Medication management  As above    Time spent was 35 minutes more than 50% was spent on counseling regarding medical conditions and treatment.  Rest of time was spent reviewing chart, reviewing blood work and radiology tests.     The patient indicates understanding of these issues and agrees to the plan.  The patient is asked to return in 3 months .

## 2024-10-09 DIAGNOSIS — E11.9 CONTROLLED TYPE 2 DIABETES MELLITUS WITHOUT COMPLICATION, WITHOUT LONG-TERM CURRENT USE OF INSULIN (HCC): ICD-10-CM

## 2024-10-10 RX ORDER — TIRZEPATIDE 5 MG/.5ML
5 INJECTION, SOLUTION SUBCUTANEOUS WEEKLY
Qty: 2 ML | Refills: 0 | Status: SHIPPED | OUTPATIENT
Start: 2024-10-10 | End: 2024-11-01

## 2024-10-21 DIAGNOSIS — I10 PRIMARY HYPERTENSION: ICD-10-CM

## 2024-10-22 RX ORDER — LOSARTAN POTASSIUM 25 MG/1
25 TABLET ORAL DAILY
Qty: 90 TABLET | Refills: 0 | OUTPATIENT
Start: 2024-10-22

## 2024-10-31 DIAGNOSIS — E11.9 CONTROLLED TYPE 2 DIABETES MELLITUS WITHOUT COMPLICATION, WITHOUT LONG-TERM CURRENT USE OF INSULIN (HCC): ICD-10-CM

## 2024-11-01 ENCOUNTER — PATIENT MESSAGE (OUTPATIENT)
Dept: FAMILY MEDICINE CLINIC | Facility: CLINIC | Age: 49
End: 2024-11-01

## 2024-11-01 RX ORDER — TIRZEPATIDE 5 MG/.5ML
5 INJECTION, SOLUTION SUBCUTANEOUS WEEKLY
Qty: 6 ML | Refills: 0 | Status: SHIPPED | OUTPATIENT
Start: 2024-11-01

## 2024-11-01 RX ORDER — TIRZEPATIDE 5 MG/.5ML
INJECTION, SOLUTION SUBCUTANEOUS
Qty: 2 ML | Refills: 0 | OUTPATIENT
Start: 2024-11-01

## 2024-11-07 RX ORDER — TIRZEPATIDE 5 MG/.5ML
5 INJECTION, SOLUTION SUBCUTANEOUS WEEKLY
Qty: 6 ML | Refills: 0 | OUTPATIENT
Start: 2024-11-07

## 2024-11-11 RX ORDER — AMLODIPINE BESYLATE 5 MG/1
5 TABLET ORAL DAILY
Qty: 90 TABLET | Refills: 1 | Status: SHIPPED | OUTPATIENT
Start: 2024-11-11 | End: 2025-11-06

## 2024-12-17 ENCOUNTER — OFFICE VISIT (OUTPATIENT)
Dept: FAMILY MEDICINE CLINIC | Facility: CLINIC | Age: 49
End: 2024-12-17
Payer: COMMERCIAL

## 2024-12-17 VITALS
HEIGHT: 65.5 IN | WEIGHT: 161 LBS | BODY MASS INDEX: 26.5 KG/M2 | RESPIRATION RATE: 16 BRPM | OXYGEN SATURATION: 98 % | SYSTOLIC BLOOD PRESSURE: 136 MMHG | TEMPERATURE: 99 F | HEART RATE: 83 BPM | DIASTOLIC BLOOD PRESSURE: 78 MMHG

## 2024-12-17 DIAGNOSIS — I10 PRIMARY HYPERTENSION: ICD-10-CM

## 2024-12-17 DIAGNOSIS — Z28.21 REFUSED INFLUENZA VACCINE: ICD-10-CM

## 2024-12-17 DIAGNOSIS — R80.9 TYPE 2 DIABETES MELLITUS WITH PROTEINURIA (HCC): Primary | ICD-10-CM

## 2024-12-17 DIAGNOSIS — E11.29 TYPE 2 DIABETES MELLITUS WITH PROTEINURIA (HCC): Primary | ICD-10-CM

## 2024-12-17 DIAGNOSIS — E78.2 MIXED HYPERLIPIDEMIA: ICD-10-CM

## 2024-12-17 LAB — HEMOGLOBIN A1C: 5.7 % (ref 4.3–5.6)

## 2024-12-17 PROCEDURE — 83036 HEMOGLOBIN GLYCOSYLATED A1C: CPT | Performed by: FAMILY MEDICINE

## 2024-12-17 PROCEDURE — 3044F HG A1C LEVEL LT 7.0%: CPT | Performed by: FAMILY MEDICINE

## 2024-12-17 PROCEDURE — 3078F DIAST BP <80 MM HG: CPT | Performed by: FAMILY MEDICINE

## 2024-12-17 PROCEDURE — 3075F SYST BP GE 130 - 139MM HG: CPT | Performed by: FAMILY MEDICINE

## 2024-12-17 PROCEDURE — 99214 OFFICE O/P EST MOD 30 MIN: CPT | Performed by: FAMILY MEDICINE

## 2024-12-17 PROCEDURE — 3008F BODY MASS INDEX DOCD: CPT | Performed by: FAMILY MEDICINE

## 2024-12-17 NOTE — PROGRESS NOTES
Lois Bernardo is a 49 year old female.  HPI:   In for follow-up on hypertension and diabetes  Overdue for Shingrix, PCV 20,     --Menopausal   Is considering estrogen replacement still has uterus and ovaries may consider Climara Pro  No personal or family history of PE, DVT or hypercoagulability known.  Patient is a non-smoker.  No family history of breast cancer ovarian cancer.  Symptoms include hot flashes, memory issues    ---Anxiety   In the past year her best friend and another family member passed away along with other life stressors.    Is now taking care of her great nephew who is 13 years old  and his brother who is 7 year old at her home.  The hopes this this will be temporary and the grandmother will be taking over in the meantime she is trying to do the best that she can from them.    --- Hypertension  On amlodipine 5 mg, losartan 50 mg tolerates medications well no side effects  Blood pressure today 136/78  normal  No heart scan completed yet but did do stress echocardiogram 6/20/2024 normal  8/6/2024  TSH 2.0   8/6/2024 GFR 85, electrolytes normal, calcium normal   Successful weight loss with Mounjaro 5 mg for diabetes  Stress echo normal consistent elevated BP during testing  Denies any headaches, blurred vision, chest pressure, dizziness, fainting spells  No personal or family history known for significant coronary disease besides paternal grandmother heart attack in her mid 80s     ---- Diabetes controlled with elevated urine microalbumin creatinine ratio  12/17/2024 hemoglobin A1c 5.7  6/6/2024 hemoglobin A1c  is at 5.8 % with Mounjaro  3/5/2024 hemoglobin A1c 7.3  8/6/2024 urine microalbumin creatinine ratio reduced to 59.9   3/21/2024 elevated urine microalbumin creatinine ratio at 60.6     Component      Latest Ref Rng 9/9/2022 11/16/2022 3/5/2024 6/6/2024   HEMOGLOBIN A1C      4.3 - 5.6 %  6.3 !  7.3 !  5.8 !    Cartridge Lot#      Numeric  [id number]  [id number]    Cartridge Expiration  Date      Date  09/30/2024 11/30/2025 01/29/26    HEMOGLOBIN A1c      <5.7 % 6.8 (H)       ESTIMATED AVERAGE GLUCOSE      68 - 126 mg/dL 148 (H)         Component      Latest Ref Rng 12/17/2024   HEMOGLOBIN A1C      4.3 - 5.6 % 5.7 !    Cartridge Lot#      Numeric [id number]    Cartridge Expiration Date      Date 08/08/20226    HEMOGLOBIN A1c      <5.7 %    ESTIMATED AVERAGE GLUCOSE      68 - 126 mg/dL      Component      Latest Ref Rng 8/6/2024   MALB URINE      mg/dL 4.60    CREATININE UR RANDOM      mg/dL 76.80    MALB/CRE CALC      <=30.0 ug/mg 59.9 (H)       Taking Mounjaro 5 mg dosing September, October and November 2024 is considering to staying to it through January and then possibly going off of it to see how the blood sugars do.  Mounjaro 2.5 mg was originally started 3/9/2024 well-tolerated no side effects.  Has tolerated medication well did not get any nausea, vomiting, diarrhea, constipation or GERD  Less eating changed less eating with healthy diet and 2-3 days exercise   .  No family history or personal history of medullary thyroid cancer no FH MEN,  no personal history or family history of pancreatitis.  No present GERD symptoms or bowel movement issues.    Feet  no numbness, tingling or pain  Will get the eye exam again states it was done summer 2024  [facility] exam this summer will forward eye exam denies any vision changes denies any hypoglycemia,     Has had improvement with weight was originally 184 pounds now 161  pounds.       Wt Readings from Last 6 Encounters:   12/17/24 161 lb (73 kg)   09/17/24 170 lb (77.1 kg)   07/25/24 168 lb (76.2 kg)   06/06/24 175 lb (79.4 kg)   03/05/24 184 lb (83.5 kg)   12/07/22 178 lb (80.7 kg)       Current Outpatient Medications   Medication Sig Dispense Refill    amLODIPine 5 MG Oral Tab Take 1 tablet (5 mg total) by mouth daily. 90 tablet 1    Tirzepatide (MOUNJARO) 5 MG/0.5ML Subcutaneous Solution Auto-injector Inject 5 mg into the skin once a  week. 6 mL 0    Bacillus Coagulans-Inulin (PROBIOTIC) 1-250 BILLION-MG Oral Cap       Eduar Aguiar 550 MG Oral Cap       Misc Natural Products (BEET ROOT OR) Take by mouth.      Multiple Vitamins-Minerals (WOMENS MULTIVITAMIN OR) Take 1 tablet by mouth daily.      Ascorbic Acid (VITAMIN C OR) Take 1 tablet by mouth daily as needed.      losartan 50 MG Oral Tab Take 1 tablet (50 mg total) by mouth daily. (Patient not taking: Reported on 12/17/2024) 90 tablet 1    Microlet Lancets Does not apply Misc 1 strip by In Vitro route daily. (Patient not taking: Reported on 12/17/2024)      Glucose Blood (ACCU-CHEK AMBERLY PLUS) In Vitro Strip Take blood sugar fasting. (Patient not taking: Reported on 12/17/2024) 100 strip 2    Blood Glucose Monitoring Suppl (ACCU-CHEK AMBERLY PLUS) w/Device Does not apply Kit 1 Device by Other route 2 (two) times daily. (Patient not taking: Reported on 12/17/2024) 1 kit 0    Lancets Misc. (ACCU-CHEK SOFTCLIX LANCET DEV) Does not apply Kit Take blood sugars daily fasting (Patient not taking: Reported on 9/17/2024) 1 kit 2      Past Medical History:    Acute nonsuppurative otitis media, unspecified    Acute upper respiratory infections of unspecified site    Allergic rhinitis    Cough    Laboratory examination ordered as part of a routine general medical examination    Blood chemistry screening    Otalgia, unspecified    Routine gynecological examination    Unspecified essential hypertension      Social History:  Social History     Socioeconomic History    Marital status:    Tobacco Use    Smoking status: Never     Passive exposure: Never    Smokeless tobacco: Never   Vaping Use    Vaping status: Never Used   Substance and Sexual Activity    Alcohol use: Not Currently     Alcohol/week: 0.0 - 1.0 standard drinks of alcohol    Drug use: No    Sexual activity: Yes     Partners: Male     Birth control/protection: Vasectomy   Other Topics Concern     Service No    Blood Transfusions No     Caffeine Concern No     Comment: 2 [employer] weekly    Occupational Exposure No    Hobby Hazards No    Sleep Concern No    Stress Concern No    Weight Concern Yes    Special Diet Yes    Back Care No    Exercise Yes    Bike Helmet No    Seat Belt Yes    Self-Exams No        REVIEW OF SYSTEMS:   GENERAL HEALTH: feels well otherwise  SKIN: denies any unusual skin lesions or rashes  RESPIRATORY: denies shortness of breath with exertion  CARDIOVASCULAR: denies chest pain on exertion  GI: denies abdominal pain and denies heartburn  NEURO: denies headaches  Musculoskeletal: No motor deficits  Endocrine diabetes see above  Psych see above some life stressors that she is managing well with  EXAM:   /78 (BP Location: Right arm, Patient Position: Sitting, Cuff Size: adult)   Pulse 83   Temp 98.7 °F (37.1 °C) (Temporal)   Resp 16   Ht 5' 5.5\" (1.664 m)   Wt 161 lb (73 kg)   SpO2 98%   BMI 26.38 kg/m²   GENERAL: well developed, well nourished,in no apparent distress  SKIN: no rashes,no suspicious lesions  EYES: sclera clear without injection  NECK: supple,no adenopathy, thyroid normal to palpation  LUNGS: clear to auscultation no rales, rhonchi or wheezes  CARDIO: RRR without murmur  GI: Normal bowel sounds ×4 quadrant, no hepatosplenomegaly or masses, and no tenderness   EXTREMITIES: no cyanosis, clubbing or edema  Musculoskeletal: No gross deficit  Neurological: nerves II through XII grossly intact no sensorimotor deficit  Psychological: Mood and affect are normal.  Good communication skills.  ASSESSMENT AND PLAN:     Encounter Diagnoses   Name Primary?    Type 2 diabetes mellitus with proteinuria (HCC) Yes    Mixed hyperlipidemia     Primary hypertension     Refused influenza vaccine        Orders Placed This Encounter   Procedures    POC Hemoglobin A1C    Influenza Refused       Meds & Refills for this Visit:  Requested Prescriptions      No prescriptions requested or ordered in this encounter       Imaging  & Consults:  INFLUENZA REFUSED [facility]  1. Controlled type 2 diabetes mellitus without complication, without long-term current use of insulin (HCC)  Continue with checking feet daily  Obtain eye exam results from [facility] [place] done summer 2024  Continue with checking feet daily  Mounjaro 5 mg weekly we will stay at this dose if continues to tolerate through January 2025  Hemoglobin A1c due 3/2025  Continue with losartan 50 mg  Continue with rosuvastatin 5 mg takes 2-3 times a week  No family history or personal history of medullary thyroid cancer no FH MEN,  no personal history or family history of pancreatitis.  No present GERD symptoms or bowel movement issues.  If needed use over-the-counter MiraLAX, stool softener and increase fiber if constipation symptoms.  Continue with trying to maintain water intake at least at 62 ounces per day  Small frequent meals with adequate protein goal would be 60 g/day and approximately 1200 darrius/day  Exercise ideally 30 minutes/day of aerobic exercise  Discussed increased risk for gallbladder disease, GERD, constipation with medication.    If needs anesthesia no medication 1 week before anesthesia  Medication can be increased monthly if tolerated and needed up to the optimal dose.  Follow-up in 3 months     - POC Hemoglobin A1C presently at 5.7%    2. Mixed hyperlipidemia  Continue with rosuvastatin 5 mg 3 times a week  3. Primary hypertension  Continue with losartan 50 mg and amlodipine 5 mg   Reviewed medication benefits and side effects.     4. Refused influenza vaccine    Discussed estrogen and progesterone patch such as Climara pro for menopausal symptoms patient will consider and call back to the office if wants to start it reviewed risks.  Use, risks and benefits of hormonal contraception discussed including blood clot that can go to the lungs, heart or brain and cause Heart Attack, Stroke and even Death. These risks are further increased with smoking. Do not start  smoking. Patient verbalizes understanding.  Time spent was 35 minutes more than 50% was spent on counseling regarding medical conditions and treatment.  Rest of time was spent reviewing chart, reviewing blood work and radiology tests.   Provider patient relationship has had a longitudinal long term relationship to address and treat complex conditions.    The patient indicates understanding of these issues and agrees to the plan.  The patient is asked to return in 6.

## 2025-03-10 ENCOUNTER — OFFICE VISIT (OUTPATIENT)
Dept: FAMILY MEDICINE CLINIC | Facility: CLINIC | Age: 50
End: 2025-03-10
Payer: COMMERCIAL

## 2025-03-10 VITALS
TEMPERATURE: 99 F | DIASTOLIC BLOOD PRESSURE: 74 MMHG | SYSTOLIC BLOOD PRESSURE: 136 MMHG | WEIGHT: 166.19 LBS | HEIGHT: 65.5 IN | BODY MASS INDEX: 27.36 KG/M2 | RESPIRATION RATE: 16 BRPM | OXYGEN SATURATION: 98 % | HEART RATE: 86 BPM

## 2025-03-10 DIAGNOSIS — Z79.899 NEW MEDICATION ADDED: ICD-10-CM

## 2025-03-10 DIAGNOSIS — Z00.00 WELL FEMALE EXAM WITHOUT GYNECOLOGICAL EXAM: Primary | ICD-10-CM

## 2025-03-10 DIAGNOSIS — Z12.31 SCREENING MAMMOGRAM FOR BREAST CANCER: ICD-10-CM

## 2025-03-10 DIAGNOSIS — E78.2 MIXED HYPERLIPIDEMIA: ICD-10-CM

## 2025-03-10 DIAGNOSIS — Z13.6 SCREENING FOR HEART DISEASE: ICD-10-CM

## 2025-03-10 DIAGNOSIS — Z13.220 ENCOUNTER FOR LIPID SCREENING FOR CARDIOVASCULAR DISEASE: ICD-10-CM

## 2025-03-10 DIAGNOSIS — Z13.21 SCREENING FOR ENDOCRINE, NUTRITIONAL, METABOLIC AND IMMUNITY DISORDER: ICD-10-CM

## 2025-03-10 DIAGNOSIS — N95.1 MENOPAUSAL SYMPTOMS: ICD-10-CM

## 2025-03-10 DIAGNOSIS — E11.29 CONTROLLED TYPE 2 DIABETES MELLITUS WITH MICROALBUMINURIA, WITHOUT LONG-TERM CURRENT USE OF INSULIN (HCC): ICD-10-CM

## 2025-03-10 DIAGNOSIS — R80.9 CONTROLLED TYPE 2 DIABETES MELLITUS WITH MICROALBUMINURIA, WITHOUT LONG-TERM CURRENT USE OF INSULIN (HCC): ICD-10-CM

## 2025-03-10 DIAGNOSIS — Z13.228 SCREENING FOR ENDOCRINE, NUTRITIONAL, METABOLIC AND IMMUNITY DISORDER: ICD-10-CM

## 2025-03-10 DIAGNOSIS — Z12.11 COLON CANCER SCREENING: ICD-10-CM

## 2025-03-10 DIAGNOSIS — Z13.6 ENCOUNTER FOR LIPID SCREENING FOR CARDIOVASCULAR DISEASE: ICD-10-CM

## 2025-03-10 DIAGNOSIS — Z13.0 SCREENING FOR ENDOCRINE, NUTRITIONAL, METABOLIC AND IMMUNITY DISORDER: ICD-10-CM

## 2025-03-10 DIAGNOSIS — Z13.29 SCREENING FOR ENDOCRINE, NUTRITIONAL, METABOLIC AND IMMUNITY DISORDER: ICD-10-CM

## 2025-03-10 DIAGNOSIS — I10 PRIMARY HYPERTENSION: ICD-10-CM

## 2025-03-10 PROCEDURE — 99396 PREV VISIT EST AGE 40-64: CPT | Performed by: FAMILY MEDICINE

## 2025-03-10 PROCEDURE — 3078F DIAST BP <80 MM HG: CPT | Performed by: FAMILY MEDICINE

## 2025-03-10 PROCEDURE — 99214 OFFICE O/P EST MOD 30 MIN: CPT | Performed by: FAMILY MEDICINE

## 2025-03-10 PROCEDURE — 82043 UR ALBUMIN QUANTITATIVE: CPT | Performed by: FAMILY MEDICINE

## 2025-03-10 PROCEDURE — 3060F POS MICROALBUMINURIA REV: CPT | Performed by: FAMILY MEDICINE

## 2025-03-10 PROCEDURE — 82570 ASSAY OF URINE CREATININE: CPT | Performed by: FAMILY MEDICINE

## 2025-03-10 PROCEDURE — 3008F BODY MASS INDEX DOCD: CPT | Performed by: FAMILY MEDICINE

## 2025-03-10 PROCEDURE — 3061F NEG MICROALBUMINURIA REV: CPT | Performed by: FAMILY MEDICINE

## 2025-03-10 PROCEDURE — 3075F SYST BP GE 130 - 139MM HG: CPT | Performed by: FAMILY MEDICINE

## 2025-03-10 RX ORDER — ESTRADIOL AND LEVONORGESTREL .045; .015 MG/D; MG/D
1 PATCH TRANSDERMAL WEEKLY
Qty: 12 PATCH | Refills: 3 | Status: SHIPPED | OUTPATIENT
Start: 2025-03-10

## 2025-03-10 NOTE — PROGRESS NOTES
HPI:   Lois Bernardo is a 49 year old female who presents for a complete physical exam and follow-up on diabetes.     --Menopausal   Is considering estrogen replacement still has uterus and ovaries wants to try Climara Pro  No personal or family history of PE, DVT or hypercoagulability known.  Patient is a non-smoker.  No family history of breast cancer ovarian cancer.  Symptoms include hot flashes, memory issues, irritability vaginal dryness     --- Hypertension  On amlodipine 5 mg, losartan 50 mg tolerates medications well no side effects  Blood pressure today 136/74  normal  No heart scan completed yet but did do stress echocardiogram 6/20/2024 normal  8/6/2024  TSH 2.0   8/6/2024 GFR 85, electrolytes normal, calcium normal   Successful weight loss with Mounjaro 5 mg for diabetes discontinued it since weight was that goal and blood sugars were under control in January  Stress echo normal consistent elevated BP during testing  Denies any headaches, blurred vision, chest pressure, dizziness, fainting spells  No personal or family history known for significant coronary disease besides paternal grandmother heart attack in her mid 80s     ---- Diabetes controlled with elevated urine microalbumin creatinine ratio  Dilated eye exam due  Feet no neuropathy no numbness or tingling  Not checking blood sugars  Stopped the Mounjaro 5 mg end of January 12/17/2024 hemoglobin A1c 5.7  6/6/2024 hemoglobin A1c  is at 5.8 % with Mounjaro  3/5/2024 hemoglobin A1c 7.3  8/6/2024 urine microalbumin creatinine ratio reduced to 59.9   3/21/2024 elevated urine microalbumin creatinine ratio at 60.6         --- Complete physical    Preventative care female:    Dental exams UTD    Eye exams UTD      Immunizations: Tdap  2017;  PCV 20 she defers, Shingrix she defers today, COVID-19 deferred, influenza deferred    Skin cancer screening defers dermatologist none    Colon cancer screening   Colonoscopy needed is willing to do this year  never done;  cologuard done 2/16/2023 normal, no FH colon cancer no colon polyps      Lung cancer screening   not indicated  no history of smoking    Breast cancer screening: 3/21/24 Mammogram    Cervical cancer screening: 3/10/24 Pap smear     Osteoporosis screening: 3/21/24 Bone density        Lumbar T score 0.2 , total hip 0.1 and femoral neck -0.5  no family history of hip fracture  not taking calcium and vitamin D supplement   yes weight bearing exercise    Heart disease screening:   Ultrafast CT of the heart needed    Alcohol screening  social 0  per week     Sleep apnea BMI 27 snoring or witnessed apnea  none     Depression/anxiety screening PHQ9  0  and REUBEN-7 0    Exercise    5 days a week aerobic and weights     DIet  avoiding processed foods carbohydrates and saturated fats      Family history:  PGM AMI; no cancer Dad DM alc/drugs      Social history:  has children, no alcohol never smoked exercise 5 days a week Works as a     Wt Readings from Last 6 Encounters:   03/10/25 166 lb 3.2 oz (75.4 kg)   12/17/24 161 lb (73 kg)   09/17/24 170 lb (77.1 kg)   07/25/24 168 lb (76.2 kg)   06/06/24 175 lb (79.4 kg)   03/05/24 184 lb (83.5 kg)     Body mass index is 27.24 kg/m².       Results for orders placed or performed in visit on 12/17/24   POC Hemoglobin A1C    Collection Time: 12/17/24 10:05 AM   Result Value Ref Range    HEMOGLOBIN A1C 5.7 (A) 4.3 - 5.6 %    Cartridge Lot# [id number] Numeric    Cartridge Expiration Date 08/08/20226 Date        Current Outpatient Medications   Medication Sig Dispense Refill    amLODIPine 5 MG Oral Tab Take 1 tablet (5 mg total) by mouth daily. 90 tablet 1    losartan 50 MG Oral Tab Take 1 tablet (50 mg total) by mouth daily. 90 tablet 1    Bacillus Coagulans-Inulin (PROBIOTIC) 1-250 BILLION-MG Oral Cap       Multiple Vitamins-Minerals (WOMENS MULTIVITAMIN OR) Take 1 tablet by mouth daily.      Ascorbic Acid (VITAMIN C OR) Take 1 tablet by mouth daily as  needed.      Tirzepatide (MOUNJARO) 5 MG/0.5ML Subcutaneous Solution Auto-injector Inject 5 mg into the skin once a week. (Patient not taking: Reported on 3/10/2025) 6 mL 0    [place] Berry 550 MG Oral Cap  (Patient not taking: Reported on 3/10/2025)      Misc Natural Products (BEET ROOT OR) Take by mouth. (Patient not taking: Reported on 3/10/2025)      Microlet Lancets Does not apply Misc 1 strip by In Vitro route daily. (Patient not taking: Reported on 9/17/2024)      Lancets Misc. (ACCU-CHEK SOFTCLIX LANCET DEV) Does not apply Kit Take blood sugars daily fasting (Patient not taking: Reported on 3/10/2025) 1 kit 2      Past Medical History:    Acute nonsuppurative otitis media, unspecified    Acute upper respiratory infections of unspecified site    Allergic rhinitis    Cough    Laboratory examination ordered as part of a routine general medical examination    Blood chemistry screening    Otalgia, unspecified    Routine gynecological examination    Unspecified essential hypertension      Past Surgical History:   Procedure Laterality Date    Colposcopy, cervix w/upper adjacent vagina; w/biopsy(s), cervix      Cryocautery of cervix      D & c      Other surgical history  09/2018    fibroids    Pap smear      Cervical      Family History   Problem Relation Age of Onset    Asthma Mother     Diabetes Father         DM    Other (liver failure[other]) Father     Other (liver transplant[other]) Father     Other (hepatitis C[other]) Father     Diabetes Paternal Grandmother       Social History:   Social History     Socioeconomic History    Marital status:    Tobacco Use    Smoking status: Never     Passive exposure: Never    Smokeless tobacco: Never   Vaping Use    Vaping status: Never Used   Substance and Sexual Activity    Alcohol use: Not Currently     Alcohol/week: 0.0 - 1.0 standard drinks of alcohol    Drug use: No    Sexual activity: Yes     Partners: Male     Birth control/protection: Vasectomy   Other  Topics Concern     Service No    Blood Transfusions No    Caffeine Concern No     Comment: 2 [employer] weekly    Occupational Exposure No    Hobby Hazards No    Sleep Concern No    Stress Concern No    Weight Concern Yes    Special Diet Yes    Back Care No    Exercise Yes    Bike Helmet No    Seat Belt Yes    Self-Exams No     Social Drivers of Health     Food Insecurity: No Food Insecurity (3/10/2025)    NCSS - Food Insecurity     Worried About Running Out of Food in the Last Year: No     Ran Out of Food in the Last Year: No   Transportation Needs: No Transportation Needs (3/10/2025)    NCSS - Transportation     Lack of Transportation: No   Housing Stability: Not At Risk (3/10/2025)    NCSS - Housing/Utilities     Has Housing: Yes     Worried About Losing Housing: No     Unable to Get Utilities: No     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   Exercise: Presently none plans on restarting.  Diet: Watches carbohydrates regularly     REVIEW OF SYSTEMS:   GENERAL: denies fevers, weakness, trouble sleeping or weight changes  SKIN: denies any unusual skin lesions or rashes  EYES:denies vision changes  HEENT: denies upper respiratory symptoms  LUNGS: denies cough or shortness of breath with exertion  CHEST:  denies breast changes or pain  CARDIOVASCULAR: denies chest pain or tightness on exertion: no edema  VASCULAR: denies leg cramps  GI: denies abdominal pain, bowel movement changes, blood in stool  : denies urinary problems, vaginal discharge or discomfort,  periods better not as heavy  MUSCULOSKELETAL: denies joint pain or stiffness  NEURO: denies headaches, tingling or dizziness  PSYCHE: denies depression or anxiety  HEMATOLOGIC: denies bleeding abnormalities; history of iron deficiency  ENDOCRINE: denies temperature intolerance, polyuria, or excessive sweating.  LYMPHATICS: denies swollen glands      EXAM:   /74 (BP Location: Left arm, Patient Position: Sitting, Cuff Size: adult)    Pulse 86   Temp 98.6 °F (37 °C) (Temporal)   Resp 16   Ht 5' 5.5\" (1.664 m)   Wt 166 lb 3.2 oz (75.4 kg)   SpO2 98%   BMI 27.24 kg/m²   Body mass index is 27.24 kg/m².   GENERAL: well developed, well nourished and in no apparent distress  SKIN: no rashes,no suspicious lesions  HEENT: atraumatic, normocephalic,ears, nose and throat are normal  EYES: PERRLA, EOMI, sclera, conjunctiva are clear  NECK: supple,no adenopathy,no carotid bruits  CHEST: no chest tenderness  BREAST: symmetrical, no suspicious mass, no nipple dimpling or discharge.  LUNGS: clear to auscultation bilateral, no rales, rhonchi or wheezing  CARDIO: RRR without murmur normal S1S2  ABD:  normal bowel sounds,soft, non tender, no masses, HSM or tenderness  : Patient defers asymptomatic besides vaginal atrophy/dryness    MUSCULOSKELETAL: gait regina,l no gross M/S defect.  EXTREMITIES: no clubbing, cyanosis, or edema  NEURO: oriented times three, cranial nerves are grossly intact, no gross motor or sensory deficit.  Bilateral barefoot skin diabetic exam is normal, visualized feet and the appearance is normal.  Bilateral monofilament/sensation of both feet is normal.  Pulsation pedal pulse exam of both lower legs/feet is normal as well.        ASSESSMENT AND PLAN:   Lois Bernardo is a 49 year old female who presents for a complete physical exam.   Encounter Diagnoses   Name Primary?    Screening mammogram for breast cancer Yes    Type 2 diabetes mellitus with proteinuria (HCC)     Screening for endocrine, nutritional, metabolic and immunity disorder     Encounter for lipid screening for cardiovascular disease     Controlled type 2 diabetes mellitus without complication, without long-term current use of insulin (HCC)     Mixed hyperlipidemia     Primary hypertension        Orders Placed This Encounter   Procedures    Microalb/Creat Ratio, Random Urine [E]       Meds & Refills for this Visit:  Requested Prescriptions      No prescriptions  requested or ordered in this encounter       Imaging & Consults:  [facility] BITA 2D+3D SCREENING BILAT (CPT=77067/68053)  1. Well female exam without routine gynecological exam    Self breast exam explained. Health maintenance guidance given including vision and dental exams, vitamin D 1,000 iu daily with calcium 500 mg daily.  Lifestyle guidance provided recommended low fat diet and aerobic exercise 30 minutes 3-4 times weekly.    Ultrafast CT of the heart information    2. Controlled type 2 diabetes mellitus with microalbuminuria, without long-term current use of insulin ([facility])  Eye exam is overdue  Check feet daily  Check hemoglobin A1c in May  Follow-up in the office in June  Continue with healthy diet and exercise  Patient discontinued Mounjaro due to achieving weight loss goals and blood sugar goals did not want to be on it chronically  - Microalb/Creat Ratio, Random Urine [E]; Future  - Microalb/Creat Ratio, Random Urine [E]  - Comp Metabolic Panel (14); Future  - Hemoglobin A1C; Future  - Lipid Panel; Future  - CT CALCIUM SCORING; Future    3. Primary hypertension  Continue with amlodipine 5 mg daily and losartan 50 mg daily well-tolerated no side effects  - Comp Metabolic Panel (14); Future    4. Mixed hyperlipidemia  Prior prescribed rosuvastatin patient deferred taking  Get heart scan testing  5. Screening mammogram for breast cancer    - [facility] BITA 2D+3D SCREENING BILAT (CPT=77067/18404); Future    6. Screening for endocrine, nutritional, metabolic and immunity disorder  - CBC With Differential With Platelet; Future  - Comp Metabolic Panel (14); Future  - TSH and Free T4; Future    7. Encounter for lipid screening for cardiovascular disease    8. Colon cancer screening  - Gastro Referral - External    9. Screening for heart disease  - CT CALCIUM SCORING; Future    10. Menopausal symptoms  No personal or family history of PE, DVT or hypercoagulability known.  Patient is a non-smoker.  No family history of breast  cancer ovarian cancer.  Use, risks and benefits of  ERT discussed including blood clot that can go to the lungs, heart or brain and cause Heart Attack, Stroke and even Death. These risks are further increased with smoking. Do not start smoking. Patient verbalizes understanding.  Reviewed medication benefits and side effects.   Mammograms due yearly  - Estradiol-Levonorgestrel (CLIMARA PRO) 0.045-0.015 MG/DAY Transdermal Patch Weekly; Place 1 patch onto the skin once a week.  Dispense: 12 patch; Refill: 3    The patient indicates understanding of these issues and agrees to the plan.  The patient is asked to return for follow-up on hypertension in 1 month

## 2025-03-11 LAB
CREAT UR-SCNC: 175.4 MG/DL
MICROALBUMIN UR-MCNC: 7.8 MG/DL
MICROALBUMIN/CREAT 24H UR-RTO: 44.5 UG/MG (ref ?–30)

## 2025-03-12 DIAGNOSIS — I10 PRIMARY HYPERTENSION: ICD-10-CM

## 2025-03-12 RX ORDER — LOSARTAN POTASSIUM 50 MG/1
50 TABLET ORAL DAILY
Qty: 30 TABLET | Refills: 0 | Status: SHIPPED | OUTPATIENT
Start: 2025-03-12

## 2025-04-07 ENCOUNTER — PATIENT MESSAGE (OUTPATIENT)
Dept: FAMILY MEDICINE CLINIC | Facility: CLINIC | Age: 50
End: 2025-04-07

## 2025-04-08 DIAGNOSIS — I10 PRIMARY HYPERTENSION: ICD-10-CM

## 2025-04-08 RX ORDER — LOSARTAN POTASSIUM 50 MG/1
50 TABLET ORAL DAILY
Qty: 30 TABLET | Refills: 0 | Status: SHIPPED | OUTPATIENT
Start: 2025-04-08

## 2025-05-02 RX ORDER — AMLODIPINE BESYLATE 5 MG/1
5 TABLET ORAL DAILY
Qty: 90 TABLET | Refills: 0 | Status: SHIPPED | OUTPATIENT
Start: 2025-05-02

## 2025-05-05 DIAGNOSIS — I10 PRIMARY HYPERTENSION: ICD-10-CM

## 2025-05-06 RX ORDER — LOSARTAN POTASSIUM 50 MG/1
50 TABLET ORAL DAILY
Qty: 30 TABLET | Refills: 0 | Status: SHIPPED | OUTPATIENT
Start: 2025-05-06

## 2025-05-17 DIAGNOSIS — I10 PRIMARY HYPERTENSION: ICD-10-CM

## 2025-05-19 ENCOUNTER — PATIENT MESSAGE (OUTPATIENT)
Dept: FAMILY MEDICINE CLINIC | Facility: CLINIC | Age: 50
End: 2025-05-19

## 2025-05-19 RX ORDER — LOSARTAN POTASSIUM 50 MG/1
50 TABLET ORAL DAILY
Qty: 30 TABLET | Refills: 0 | OUTPATIENT
Start: 2025-05-19

## 2025-05-28 ENCOUNTER — LAB ENCOUNTER (OUTPATIENT)
Dept: LAB | Age: 50
End: 2025-05-28
Attending: FAMILY MEDICINE
Payer: COMMERCIAL

## 2025-05-28 ENCOUNTER — HOSPITAL ENCOUNTER (OUTPATIENT)
Dept: MAMMOGRAPHY | Age: 50
Discharge: HOME | End: 2025-05-28
Attending: FAMILY MEDICINE
Payer: COMMERCIAL

## 2025-05-28 DIAGNOSIS — Z13.21 SCREENING FOR ENDOCRINE, NUTRITIONAL, METABOLIC AND IMMUNITY DISORDER: ICD-10-CM

## 2025-05-28 DIAGNOSIS — Z13.0 SCREENING FOR ENDOCRINE, NUTRITIONAL, METABOLIC AND IMMUNITY DISORDER: ICD-10-CM

## 2025-05-28 DIAGNOSIS — Z12.31 SCREENING MAMMOGRAM FOR BREAST CANCER: ICD-10-CM

## 2025-05-28 DIAGNOSIS — Z13.29 SCREENING FOR ENDOCRINE, NUTRITIONAL, METABOLIC AND IMMUNITY DISORDER: ICD-10-CM

## 2025-05-28 DIAGNOSIS — E11.9 CONTROLLED TYPE 2 DIABETES MELLITUS WITHOUT COMPLICATION, WITHOUT LONG-TERM CURRENT USE OF INSULIN (HCC): ICD-10-CM

## 2025-05-28 DIAGNOSIS — Z13.228 SCREENING FOR ENDOCRINE, NUTRITIONAL, METABOLIC AND IMMUNITY DISORDER: ICD-10-CM

## 2025-05-28 DIAGNOSIS — E11.29 CONTROLLED TYPE 2 DIABETES MELLITUS WITH MICROALBUMINURIA, WITHOUT LONG-TERM CURRENT USE OF INSULIN (HCC): ICD-10-CM

## 2025-05-28 DIAGNOSIS — E78.2 MIXED HYPERLIPIDEMIA: ICD-10-CM

## 2025-05-28 DIAGNOSIS — R80.9 CONTROLLED TYPE 2 DIABETES MELLITUS WITH MICROALBUMINURIA, WITHOUT LONG-TERM CURRENT USE OF INSULIN (HCC): ICD-10-CM

## 2025-05-28 DIAGNOSIS — I10 PRIMARY HYPERTENSION: ICD-10-CM

## 2025-05-28 LAB
ALBUMIN SERPL-MCNC: 4.6 G/DL (ref 3.2–4.8)
ALBUMIN/GLOB SERPL: 1.6 {RATIO} (ref 1–2)
ALP LIVER SERPL-CCNC: 75 U/L (ref 41–108)
ALT SERPL-CCNC: 18 U/L (ref 10–49)
ANION GAP SERPL CALC-SCNC: 8 MMOL/L (ref 0–18)
AST SERPL-CCNC: 21 U/L (ref ?–34)
BASOPHILS # BLD AUTO: 0.05 X10(3) UL (ref 0–0.2)
BASOPHILS NFR BLD AUTO: 0.7 %
BILIRUB SERPL-MCNC: 0.3 MG/DL (ref 0.3–1.2)
BUN BLD-MCNC: 14 MG/DL (ref 9–23)
CALCIUM BLD-MCNC: 9.7 MG/DL (ref 8.7–10.6)
CHLORIDE SERPL-SCNC: 104 MMOL/L (ref 98–112)
CHOLEST SERPL-MCNC: 210 MG/DL (ref ?–200)
CO2 SERPL-SCNC: 28 MMOL/L (ref 21–32)
CREAT BLD-MCNC: 0.8 MG/DL (ref 0.55–1.02)
EGFRCR SERPLBLD CKD-EPI 2021: 89 ML/MIN/1.73M2 (ref 60–?)
EOSINOPHIL # BLD AUTO: 0.14 X10(3) UL (ref 0–0.7)
EOSINOPHIL NFR BLD AUTO: 2.1 %
ERYTHROCYTE [DISTWIDTH] IN BLOOD BY AUTOMATED COUNT: 12.9 %
EST. AVERAGE GLUCOSE BLD GHB EST-MCNC: 134 MG/DL (ref 68–126)
FASTING PATIENT LIPID ANSWER: YES
FASTING STATUS PATIENT QL REPORTED: YES
GLOBULIN PLAS-MCNC: 2.9 G/DL (ref 2–3.5)
GLUCOSE BLD-MCNC: 95 MG/DL (ref 70–99)
HBA1C MFR BLD: 6.3 % (ref ?–5.7)
HCT VFR BLD AUTO: 42.4 % (ref 35–48)
HDLC SERPL-MCNC: 52 MG/DL (ref 40–59)
HGB BLD-MCNC: 13.6 G/DL (ref 12–16)
IMM GRANULOCYTES # BLD AUTO: 0.01 X10(3) UL (ref 0–1)
IMM GRANULOCYTES NFR BLD: 0.1 %
LDLC SERPL CALC-MCNC: 136 MG/DL (ref ?–100)
LYMPHOCYTES # BLD AUTO: 2.77 X10(3) UL (ref 1–4)
LYMPHOCYTES NFR BLD AUTO: 41.5 %
MCH RBC QN AUTO: 27 PG (ref 26–34)
MCHC RBC AUTO-ENTMCNC: 32.1 G/DL (ref 31–37)
MCV RBC AUTO: 84.3 FL (ref 80–100)
MONOCYTES # BLD AUTO: 0.48 X10(3) UL (ref 0.1–1)
MONOCYTES NFR BLD AUTO: 7.2 %
NEUTROPHILS # BLD AUTO: 3.22 X10 (3) UL (ref 1.5–7.7)
NEUTROPHILS # BLD AUTO: 3.22 X10(3) UL (ref 1.5–7.7)
NEUTROPHILS NFR BLD AUTO: 48.4 %
NONHDLC SERPL-MCNC: 158 MG/DL (ref ?–130)
OSMOLALITY SERPL CALC.SUM OF ELEC: 290 MOSM/KG (ref 275–295)
PLATELET # BLD AUTO: 224 10(3)UL (ref 150–450)
POTASSIUM SERPL-SCNC: 4.5 MMOL/L (ref 3.5–5.1)
PROT SERPL-MCNC: 7.5 G/DL (ref 5.7–8.2)
RBC # BLD AUTO: 5.03 X10(6)UL (ref 3.8–5.3)
SODIUM SERPL-SCNC: 140 MMOL/L (ref 136–145)
T4 FREE SERPL-MCNC: 1.2 NG/DL (ref 0.8–1.7)
TRIGL SERPL-MCNC: 121 MG/DL (ref 30–149)
TSI SER-ACNC: 1.17 UIU/ML (ref 0.55–4.78)
VLDLC SERPL CALC-MCNC: 22 MG/DL (ref 0–30)
WBC # BLD AUTO: 6.7 X10(3) UL (ref 4–11)

## 2025-05-28 PROCEDURE — 83036 HEMOGLOBIN GLYCOSYLATED A1C: CPT

## 2025-05-28 PROCEDURE — 77067 SCR MAMMO BI INCL CAD: CPT | Performed by: FAMILY MEDICINE

## 2025-05-28 PROCEDURE — 80061 LIPID PANEL: CPT

## 2025-05-28 PROCEDURE — 84439 ASSAY OF FREE THYROXINE: CPT

## 2025-05-28 PROCEDURE — 85025 COMPLETE CBC W/AUTO DIFF WBC: CPT

## 2025-05-28 PROCEDURE — 77063 BREAST TOMOSYNTHESIS BI: CPT | Performed by: FAMILY MEDICINE

## 2025-05-28 PROCEDURE — 84443 ASSAY THYROID STIM HORMONE: CPT

## 2025-05-28 PROCEDURE — 80053 COMPREHEN METABOLIC PANEL: CPT

## 2025-05-28 PROCEDURE — 36415 COLL VENOUS BLD VENIPUNCTURE: CPT

## 2025-05-29 NOTE — PROGRESS NOTES
Hemoglobin A1c looks good at 6.3 continue with repeating every 6 months.  See you in the office on 6/16/2025

## 2025-05-29 NOTE — PROGRESS NOTES
Normal white and red blood cell counts.  Normal kidney and liver function testing.  Normal blood sugar testing.  Still awaiting hemoglobin A1c    Elevated lipid testing cholesterol and LDL with history of diabetes would advise taking rosuvastatin 5 mg at least every other day will discuss at office visit 6/16/2025  Decrease saturated fats and avoid processed foods.  Avoid fatty foods/fried foods.  If eating meat try lean cuts of meat such as chicken and fish (salmon and tuna).  If able, add renetta seeds, almonds, walnuts, pumpkin seeds, sunflower seeds, beans, fruits, vegetables, avocado steel cut oats, lentils, quinoa, rye, wild rice, whole grain cereals to diet.   Cook with avocado oil, grape seed oil or olive oil. Review the Mediterranean diet on line.    Normal thyroid testing    Sincerely,   Prema Rodriguez PA-C

## 2025-05-29 NOTE — PROGRESS NOTES
Left breast mass possible lymph node further evaluation is recommended proceed on with additional testing as planned for the left breast

## 2025-06-16 ENCOUNTER — OFFICE VISIT (OUTPATIENT)
Dept: FAMILY MEDICINE CLINIC | Facility: CLINIC | Age: 50
End: 2025-06-16
Payer: COMMERCIAL

## 2025-06-16 VITALS
WEIGHT: 170 LBS | DIASTOLIC BLOOD PRESSURE: 78 MMHG | BODY MASS INDEX: 27.98 KG/M2 | HEIGHT: 65.5 IN | RESPIRATION RATE: 18 BRPM | OXYGEN SATURATION: 98 % | HEART RATE: 88 BPM | TEMPERATURE: 98 F | SYSTOLIC BLOOD PRESSURE: 126 MMHG

## 2025-06-16 DIAGNOSIS — E11.9 CONTROLLED TYPE 2 DIABETES MELLITUS WITHOUT COMPLICATION, WITHOUT LONG-TERM CURRENT USE OF INSULIN (HCC): Primary | ICD-10-CM

## 2025-06-16 DIAGNOSIS — I10 PRIMARY HYPERTENSION: ICD-10-CM

## 2025-06-16 DIAGNOSIS — Z23 NEED FOR VACCINATION: ICD-10-CM

## 2025-06-16 DIAGNOSIS — E78.2 MIXED HYPERLIPIDEMIA: ICD-10-CM

## 2025-06-16 PROCEDURE — 3078F DIAST BP <80 MM HG: CPT | Performed by: FAMILY MEDICINE

## 2025-06-16 PROCEDURE — 3074F SYST BP LT 130 MM HG: CPT | Performed by: FAMILY MEDICINE

## 2025-06-16 PROCEDURE — G2211 COMPLEX E/M VISIT ADD ON: HCPCS | Performed by: FAMILY MEDICINE

## 2025-06-16 PROCEDURE — 90471 IMMUNIZATION ADMIN: CPT | Performed by: FAMILY MEDICINE

## 2025-06-16 PROCEDURE — 90750 HZV VACC RECOMBINANT IM: CPT | Performed by: FAMILY MEDICINE

## 2025-06-16 PROCEDURE — 3044F HG A1C LEVEL LT 7.0%: CPT | Performed by: FAMILY MEDICINE

## 2025-06-16 PROCEDURE — 99215 OFFICE O/P EST HI 40 MIN: CPT | Performed by: FAMILY MEDICINE

## 2025-06-16 PROCEDURE — 3072F LOW RISK FOR RETINOPATHY: CPT | Performed by: FAMILY MEDICINE

## 2025-06-16 PROCEDURE — 3008F BODY MASS INDEX DOCD: CPT | Performed by: FAMILY MEDICINE

## 2025-06-16 RX ORDER — ROSUVASTATIN CALCIUM 5 MG/1
5 TABLET, COATED ORAL
Qty: 36 TABLET | Refills: 1 | Status: SHIPPED | OUTPATIENT
Start: 2025-06-16 | End: 2025-09-14

## 2025-06-16 RX ORDER — LOSARTAN POTASSIUM 50 MG/1
50 TABLET ORAL DAILY
Qty: 90 TABLET | Refills: 1 | Status: SHIPPED | OUTPATIENT
Start: 2025-06-16

## 2025-06-16 NOTE — PROGRESS NOTES
The following individual(s) verbally consented to be recorded using ambient AI listening technology and understand that they can each withdraw their consent to this listening technology at any point by asking the clinician to turn off or pause the recording:    Patient name: Lois PALAK Bernardo

## 2025-06-16 NOTE — PROGRESS NOTES
Lois Bernardo is a 49 year old female.       The following individual(s) verbally consented to be recorded using ambient AI listening technology and understand that they can each withdraw their consent to this listening technology at any point by asking the clinician to turn off or pause the recording:    Patient name: Lois Bernardo       History of Present Illness  Lois Bernardo is a 49 year old female who presents for a follow-up visit.  For diabetes, hypertension hyperlipidemia    She is experiencing menopausal symptoms, including hot flashes and sleep disturbances. She has picked up a hormone patch but has not started using it yet.      Her blood pressure is well-controlled, and she is not currently taking Mounjaro as her hemoglobin A1c was 6.3. She previously used Mounjaro, which helped with weight loss, but is not on any diabetes medication currently.    She has a family history of high cholesterol and is not currently taking cholesterol medication as prescribed every other day. She consumes renetta seeds daily. Her LDL cholesterol is 136 and is going to restart the rosuvastatin.    Toe discomfort with current symptoms of stiffness in her right great toe. She uses a pad for cushioning and wears supportive shoes to manage the condition.    She maintains a regular exercise routine, working out five days a week. Her weight is stable, and she is mindful of her diet, although she occasionally indulges in ice cream. 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      --- Hypertension  On amlodipine 5 mg, losartan 50 mg tolerates medications well no side effects  Blood pressure today 126/78  normal  No heart scan completed yet but did do stress echocardiogram 6/20/2024 normal  8/6/2024  TSH 2.0   8/6/2024 GFR 85, electrolytes normal, calcium normal   Successful weight loss with Mounjaro 5 mg for diabetes discontinued it since weight was that goal and  blood sugars were under control in January  Stress echo normal consistent elevated BP during testing  Denies any headaches, blurred vision, chest pressure, dizziness, fainting spells  No personal or family history known for significant coronary disease besides paternal grandmother heart attack in her mid 80s     ---- Diabetes controlled with elevated urine microalbumin creatinine ratio  Dilated eye exam due  Feet no neuropathy no numbness or tingling  Not checking blood sugars  Stopped the Mounjaro 5 mg end of January 5/28/2025 hemoglobin A1c 6.3  3/10/2025 urine microalbumin creatinine ratio 44.5 slightly elevated  Component      Latest Ref Rng 5/28/2025   WBC      4.0 - 11.0 x10(3) uL 6.7    RBC      3.80 - 5.30 x10(6)uL 5.03    Hemoglobin      12.0 - 16.0 g/dL 13.6    Hematocrit      35.0 - 48.0 % 42.4    Platelet Count      150.0 - 450.0 10(3)uL 224.0    MCV      80.0 - 100.0 fL 84.3    MCH      26.0 - 34.0 pg 27.0    MCHC      31.0 - 37.0 g/dL 32.1    RDW      % 12.9    Prelim Neutrophil Abs      1.50 - 7.70 x10 (3) uL 3.22    Neutrophils Absolute      1.50 - 7.70 x10(3) uL 3.22    Lymphocytes Absolute      1.00 - 4.00 x10(3) uL 2.77    Monocytes Absolute      0.10 - 1.00 x10(3) uL 0.48    Eosinophils Absolute      0.00 - 0.70 x10(3) uL 0.14    Basophils Absolute      0.00 - 0.20 x10(3) uL 0.05    Immature Granulocyte Absolute      0.00 - 1.00 x10(3) uL 0.01    Neutrophils %      % 48.4    Lymphocytes %      % 41.5    Monocytes %      % 7.2    Eosinophils %      % 2.1    Basophils %      % 0.7    Immature Granulocyte %      % 0.1    Glucose      70 - 99 mg/dL 95    Sodium      136 - 145 mmol/L 140    Potassium      3.5 - 5.1 mmol/L 4.5    Chloride      98 - 112 mmol/L 104    Carbon Dioxide, Total      21.0 - 32.0 mmol/L 28.0    ANION GAP      0 - 18 mmol/L 8    BUN      9 - 23 mg/dL 14    CREATININE      0.55 - 1.02 mg/dL 0.80    CALCIUM      8.7 - 10.6 mg/dL 9.7    CALCULATED OSMOLALITY      275 - 295 mOsm/kg  290    EGFR      >=60 mL/min/1.73m2 89    AST (SGOT)      <34 U/L 21    ALT (SGPT)      10 - 49 U/L 18    ALKALINE PHOSPHATASE      41 - 108 U/L 75    Total Bilirubin      0.3 - 1.2 mg/dL 0.3    PROTEIN, TOTAL      5.7 - 8.2 g/dL 7.5    Albumin      3.2 - 4.8 g/dL 4.6    Globulin      2.0 - 3.5 g/dL 2.9    A/G Ratio      1.0 - 2.0  1.6    Patient Fasting for CMP? Yes    Cholesterol, Total      <200 mg/dL 210 (H)    HDL Cholesterol      40 - 59 mg/dL 52    Triglycerides      30 - 149 mg/dL 121    LDL Cholesterol Calc      <100 mg/dL 136 (H)    VLDL      0 - 30 mg/dL 22    NON-HDL CHOLESTEROL      <130 mg/dL 158 (H)    Patient Fasting for Lipid? Yes    HEMOGLOBIN A1c      <5.7 % 6.3 (H)    ESTIMATED AVERAGE GLUCOSE      68 - 126 mg/dL 134 (H)    T4,Free (Direct)      0.8 - 1.7 ng/dL 1.2    TSH      0.550 - 4.780 uIU/mL 1.166           Current Medications[1]   Past Medical History[2]   Social History:  Short Social Hx on File[3]     REVIEW OF SYSTEMS:   GENERAL HEALTH: feels well otherwise  SKIN: denies any unusual skin lesions or rashes  RESPIRATORY: denies shortness of breath with exertion  CARDIOVASCULAR: denies chest pain on exertion  GI: denies abdominal pain and denies heartburn  NEURO: denies headaches  Musculoskeletal: great toe small bunion right   EXAM:   /78 (BP Location: Left arm, Patient Position: Sitting, Cuff Size: adult)   Pulse 88   Temp 97.8 °F (36.6 °C) (Temporal)   Resp 18   Ht 5' 5.5\" (1.664 m)   Wt 170 lb (77.1 kg)   SpO2 98%   BMI 27.86 kg/m²   GENERAL: well developed, well nourished,in no apparent distress  SKIN: no rashes,no suspicious lesions  HEENT: TM clear bilaterally, nose no congestion, throat clear no erythema without mass.   EYES: PERRLA, EOM intact, sclera clear without injection  NECK: supple,no adenopathy, thyroid normal to palpation  LUNGS: clear to auscultation no rales, rhonchi or wheezes  CARDIO: RRR without murmur  GI: Normal bowel sounds ×4 quadrant, no  hepatosplenomegaly or masses, and no tenderness   EXTREMITIES: no cyanosis, clubbing or edema  Musculoskeletal:   Neurological: nerves II through XII grossly intact no sensorimotor deficit  Psychological: Mood and affect are normal.  Good communication skills.  ASSESSMENT AND PLAN:     Encounter Diagnoses   Name Primary?    Controlled type 2 diabetes mellitus without complication, without long-term current use of insulin (HCC) Yes    Primary hypertension     Mixed hyperlipidemia     Need for vaccination        Orders Placed This Encounter   Procedures    Comp Metabolic Panel (14)    Hemoglobin A1C    Lipid Panel    Shingrix 1st Dose (Today)    Shingrix Vaccine 2nd Dose (Future)       Meds & Refills for this Visit:  Requested Prescriptions     Signed Prescriptions Disp Refills    rosuvastatin 5 MG Oral Tab 36 tablet 1     Sig: Take 1 tablet (5 mg total) by mouth 3 (three) times a week.    losartan 50 MG Oral Tab 90 tablet 1     Sig: Take 1 tablet (50 mg total) by mouth daily.       Imaging & Consults:  ZOSTER VACC RECOMBINANT IM NJX  ZOSTER VACC (SHINGRIX) FUTURE 2ND DOSE      Assessment & Plan  Controlled type 2 diabetes mellitus without complication, without long-term current use of insulin (HCC)  Defers medication prior was on Mounjaro but discontinued since blood sugars have improved  Check feet daily, eye exams yearly  Hemoglobin A1c every 6 months  - Comp Metabolic Panel (14); Future  - Hemoglobin A1C; Future  - Lipid Panel; Future    A1c is 6.3%, indicating good control. Not currently on Mounjaro, which previously helped with weight loss and A1c reduction. Satisfied with current A1c level, no need for diabetes medication at this time.  - Monitor A1c levels  - Consider resuming Mounjaro if needed for weight loss or A1c control    Primary hypertension  Reviewed medication benefits and side effects.   Blood pressure is well-controlled with current medication regimen. Emphasized importance of maintaining blood  pressure control to protect kidneys and heart.  - Continue current antihypertensive medications  - Ensure losartan prescription is for 90 days  - losartan 50 MG Oral Tab; Take 1 tablet (50 mg total) by mouth daily.  Dispense: 90 tablet; Refill: 1    Mixed hyperlipidemia  Reviewed medication benefits and side effects.     Cholesterol level is 210 mg/dL, with an LDL of 136 mg/dL. Recommended dietary changes and omega-3 supplementation to help lower cholesterol levels. Previously on rosuvastatin, advised to take it 2-3 times a week.  - Prescribe rosuvastatin 2-3 times a week  - Recommend omega-3 supplementation (1-2 grams daily)  - Encourage dietary changes to include omega-3 rich foods  - rosuvastatin 5 MG Oral Tab; Take 1 tablet (5 mg total) by mouth 3 (three) times a week.  Dispense: 36 tablet; Refill: 1  - Comp Metabolic Panel (14); Future    Need for vaccination  - Shingrix 1st Dose (Today)  - Shingrix Vaccine 2nd Dose (Future); Future      Menopausal Symptoms  Experiencing menopausal symptoms, including hot flashes and sleep disturbances. Prescribed a hormone patch but has not yet started using it. Encouraged to consider starting it after her upcoming mammogram.  - Consider starting hormone patch after mammogram    Plantar Fasciitis vs. Arthritis  Reports stiffness and pain in the right great toe, which has improved but persists. Suspects arthritis in the joint rather than plantar fasciitis, as the pain is localized to the great toe joint.  - Recommend stretching exercises for the foot  - Consider orthotics for support  - Use Voltaren gel for pain management if needed    General Health Maintenance  Scheduled for a colonoscopy and mammogram soon. Discussed the importance of these screenings and encouraged to continue her healthy lifestyle.  - Complete scheduled colonoscopy on July 8  - Complete scheduled mammogram next week  - Administer shingles vaccine today    Recording duration: 38 minutes  Provider patient  relationship has had a longitudinal long term relationship to address and treat complex conditions.  Time spent was 45 minutes more than 50% was spent on counseling regarding medical conditions and treatment.  Rest of time was spent reviewing chart, reviewing blood work and radiology tests.     The patient indicates understanding of these issues and agrees to the plan.  The patient is asked to return in 6 months    This note was created by [employer] voice recognition. Errors in content may be related to improper recognition by the system; efforts to review and correct have been done but errors may still exist.             [1]   Current Outpatient Medications   Medication Sig Dispense Refill    rosuvastatin 5 MG Oral Tab Take 1 tablet (5 mg total) by mouth 3 (three) times a week. 36 tablet 1    losartan 50 MG Oral Tab Take 1 tablet (50 mg total) by mouth daily. 90 tablet 1    amLODIPine 5 MG Oral Tab TAKE 1 TABLET(5 MG) BY MOUTH DAILY 90 tablet 0    Bacillus Coagulans-Inulin (PROBIOTIC) 1-250 BILLION-MG Oral Cap       Multiple Vitamins-Minerals (WOMENS MULTIVITAMIN OR) Take 1 tablet by mouth daily.      Ascorbic Acid (VITAMIN C OR) Take 1 tablet by mouth daily as needed.      PEG 3350-KCl-Na Bicarb-NaCl 420 g Oral Recon Soln Take as directed by physician (Patient not taking: Reported on 6/16/2025) 4000 mL 0    Estradiol-Levonorgestrel (CLIMARA PRO) 0.045-0.015 MG/DAY Transdermal Patch Weekly Place 1 patch onto the skin once a week. (Patient not taking: Reported on 6/16/2025) 12 patch 3    [place] Berry 550 MG Oral Cap  (Patient not taking: Reported on 6/16/2025)      Misc Natural Products (BEET ROOT OR) Take by mouth. (Patient not taking: Reported on 6/16/2025)      Microlet Lancets Does not apply Misc 1 strip by In Vitro route daily. (Patient not taking: Reported on 6/16/2025)      Lancets Misc. (ACCU-CHEK SOFTCLIX LANCET DEV) Does not apply Kit Take blood sugars daily fasting (Patient not taking: Reported on  6/16/2025) 1 kit 2   [2]   Past Medical History:   Acute nonsuppurative otitis media, unspecified    Acute upper respiratory infections of unspecified site    Allergic rhinitis    Cough    Laboratory examination ordered as part of a routine general medical examination    Blood chemistry screening    Otalgia, unspecified    Routine gynecological examination    Unspecified essential hypertension   [3]   Social History  Socioeconomic History    Marital status:    Tobacco Use    Smoking status: Never     Passive exposure: Never    Smokeless tobacco: Never   Vaping Use    Vaping status: Never Used   Substance and Sexual Activity    Alcohol use: Not Currently     Alcohol/week: 0.0 - 1.0 standard drinks of alcohol    Drug use: No    Sexual activity: Yes     Partners: Male     Birth control/protection: Vasectomy   Other Topics Concern     Service No    Blood Transfusions No    Caffeine Concern No     Comment: 2 [employer] Te[employer] weekly    Occupational Exposure No    Hobby Hazards No    Sleep Concern No    Stress Concern No    Weight Concern Yes    Special Diet Yes    Back Care No    Exercise Yes    Bike Helmet No    Seat Belt Yes    Self-Exams No     Social Drivers of Health     Food Insecurity: No Food Insecurity (3/10/2025)    NCSS - Food Insecurity     Worried About Running Out of Food in the Last Year: No     Ran Out of Food in the Last Year: No   Transportation Needs: No Transportation Needs (3/10/2025)    NCSS - Transportation     Lack of Transportation: No   Housing Stability: Not At Risk (3/10/2025)    NCSS - Housing/Utilities     Has Housing: Yes     Worried About Losing Housing: No     Unable to Get Utilities: No

## 2025-06-23 ENCOUNTER — HOSPITAL ENCOUNTER (OUTPATIENT)
Dept: MAMMOGRAPHY | Age: 50
Discharge: HOME | End: 2025-06-23
Attending: FAMILY MEDICINE
Payer: COMMERCIAL

## 2025-06-23 ENCOUNTER — PATIENT MESSAGE (OUTPATIENT)
Dept: FAMILY MEDICINE CLINIC | Facility: CLINIC | Age: 50
End: 2025-06-23

## 2025-06-23 ENCOUNTER — HOSPITAL ENCOUNTER (OUTPATIENT)
Dept: ULTRASOUND IMAGING | Age: 50
Discharge: HOME | End: 2025-06-23
Attending: FAMILY MEDICINE
Payer: COMMERCIAL

## 2025-06-23 DIAGNOSIS — R92.2 INCONCLUSIVE MAMMOGRAM: ICD-10-CM

## 2025-06-23 PROCEDURE — 76642 ULTRASOUND BREAST LIMITED: CPT | Performed by: FAMILY MEDICINE

## 2025-06-23 PROCEDURE — 77061 BREAST TOMOSYNTHESIS UNI: CPT | Performed by: FAMILY MEDICINE

## 2025-06-23 PROCEDURE — 77065 DX MAMMO INCL CAD UNI: CPT | Performed by: FAMILY MEDICINE

## 2025-06-24 NOTE — PROGRESS NOTES
Probably benign nodule within the left breast 2 o'clock position 15 cm from the nipple.  Six-month follow-up ultrasound is recommended.

## 2025-07-08 PROBLEM — D12.0 BENIGN NEOPLASM OF CECUM: Status: ACTIVE | Noted: 2025-07-08

## 2025-07-08 PROBLEM — Z12.11 SPECIAL SCREENING FOR MALIGNANT NEOPLASMS, COLON: Status: ACTIVE | Noted: 2025-07-08

## 2025-07-08 PROBLEM — D12.4 BENIGN NEOPLASM OF DESCENDING COLON: Status: ACTIVE | Noted: 2025-07-08

## 2025-07-08 PROCEDURE — 88305 TISSUE EXAM BY PATHOLOGIST: CPT | Performed by: INTERNAL MEDICINE

## 2025-07-31 RX ORDER — AMLODIPINE BESYLATE 5 MG/1
5 TABLET ORAL DAILY
Qty: 90 TABLET | Refills: 0 | Status: SHIPPED | OUTPATIENT
Start: 2025-07-31

## (undated) DIAGNOSIS — K01.1 IMPACTED TEETH: Primary | ICD-10-CM

## (undated) NOTE — LETTER
06/14/18        Louis Bernardo  [address] [address] [place] [id number]      Dear Charly Monae,    Our records indicate that you have outstanding lab work and or testing that was ordered for you and has not yet been completed:          Vitamin D, 25-Hydroxy [

## (undated) NOTE — LETTER
11/07/18        Galilea Bernardo  [address] [address] [place] [address]      Dear Rico Falk,    Our records indicate that you have outstanding lab work and or testing that was ordered for you and has not yet been completed:        Helicobacter pylori Melindaat

## (undated) NOTE — LETTER
07/10/19        Betina Bernardo  [address] [address] [place] [address]      Dear Starla Hammans,    Our records indicate that you have outstanding lab work and or testing that was ordered for you and has not yet been completed:     To provide you with the best

## (undated) NOTE — LETTER
[facility] [address], [address]  Consent for Procedure/Sedation    Date:     Time:       1. I authorize the performance upon Lois Bernardo the following:  UTERINE FIBROID EMBOLIZATION     2.  I authorize Dr. Prudence Ng (and whomever ________________________________    ___________________    Witness: _________________________      Date: ___________________    Printed: 2019   12:40 PM  Patient Name: Colt Miss DAMIANB: 1975       Medical Record #: [id number]

## (undated) NOTE — LETTER
Pre-Procedure  Luis F Owen       I acknowledge that I have been informed of the risk involved by refusing the Urine pregnancy test on Lois PALAK Az.     I, thereby, release Angie Wellington

## (undated) NOTE — LETTER
[facility] [address], [address] [place] [address]    Consent for Anesthesia   1.   Fariha CASTELLANO 630 agree to be cared for by a physician anesthesiologist alone and/or with a nurse anesthetist, who is specially trained to monitor me and give m allergic reactions to medications, injury to my airway, heart, lungs, vision, nerves, or muscles and in extremely rare instances death. 5. My doctor has explained to me other choices available to me for my care (alternatives).   6. Pregnant Patients (“epid Printed: 9/30/2019 at 12:39 PM    Medical Record #: [id number]                                            Page 1 of 1

## (undated) NOTE — LETTER
07/10/19        Kinga Bernardo  [address] [address] [place] [address]      Dear Karolina Salgado,    Our records indicate that you have outstanding lab work and or testing that was ordered for you and has not yet been completed:  Orders Placed This Encounter

## (undated) NOTE — LETTER
11/24/17        Bindu Bernardo  [address] [address] [place] [address]      Dear Sheryl Melton,    Our records indicate that you have outstanding lab work and or testing that was ordered for you and has not yet been completed:            Ferritin       Iron A

## (undated) NOTE — MR AVS SNAPSHOT
After Visit Summary   4/7/2021    Irlanda Anne    MRN: [id number]           Visit Information     Date & Time  4/7/2021 11:00 AM Provider  JIMMY [facility] Department  [facility], [address] [address], [place] Dept.  Phone  [phone] Instructions    Evening Primrose, Black Cohash, Chasteberry (Vitex)- menopausal symtpoms      Humm Zzzzz Melatonin- or try an extended release Melatonin                     DM now offers Video Visits through [address] for adult and pediatric patients.   Vide [address]   Monday – Friday  10:00 am – 10:00 pm   Saturday – Sunday  10:00 am – 4:00 pm     [facility]   Monday – Friday  4:00 pm – 10:00 pm   Saturday – Sunday  10:00 am – 4:00 pm  WALK-